# Patient Record
Sex: MALE | Race: WHITE | NOT HISPANIC OR LATINO | Employment: OTHER | URBAN - NONMETROPOLITAN AREA
[De-identification: names, ages, dates, MRNs, and addresses within clinical notes are randomized per-mention and may not be internally consistent; named-entity substitution may affect disease eponyms.]

---

## 2024-06-07 ENCOUNTER — APPOINTMENT (EMERGENCY)
Dept: CT IMAGING | Facility: HOSPITAL | Age: 49
DRG: 392 | End: 2024-06-07
Payer: COMMERCIAL

## 2024-06-07 ENCOUNTER — HOSPITAL ENCOUNTER (INPATIENT)
Facility: HOSPITAL | Age: 49
LOS: 3 days | Discharge: HOME | DRG: 392 | End: 2024-06-10
Attending: EMERGENCY MEDICINE | Admitting: STUDENT IN AN ORGANIZED HEALTH CARE EDUCATION/TRAINING PROGRAM
Payer: COMMERCIAL

## 2024-06-07 DIAGNOSIS — K57.20 DIVERTICULITIS OF LARGE INTESTINE WITH PERFORATION WITHOUT BLEEDING: Primary | ICD-10-CM

## 2024-06-07 LAB
ALBUMIN SERPL BCP-MCNC: 3.8 G/DL (ref 3.5–5)
ALP SERPL-CCNC: 53 U/L (ref 34–104)
ALT SERPL W P-5'-P-CCNC: 16 U/L (ref 7–52)
ANION GAP SERPL CALCULATED.3IONS-SCNC: 9 MMOL/L (ref 4–13)
AST SERPL W P-5'-P-CCNC: 13 U/L (ref 13–39)
BACTERIA UR QL AUTO: NORMAL /HPF
BASOPHILS # BLD AUTO: 0.04 THOUSANDS/ÂΜL (ref 0–0.1)
BASOPHILS NFR BLD AUTO: 0 % (ref 0–1)
BILIRUB DIRECT SERPL-MCNC: 0.28 MG/DL (ref 0–0.2)
BILIRUB SERPL-MCNC: 2.49 MG/DL (ref 0.2–1)
BILIRUB UR QL STRIP: NEGATIVE
BUN SERPL-MCNC: 14 MG/DL (ref 5–25)
CALCIUM SERPL-MCNC: 8.8 MG/DL (ref 8.4–10.2)
CHLORIDE SERPL-SCNC: 102 MMOL/L (ref 96–108)
CLARITY UR: CLEAR
CO2 SERPL-SCNC: 23 MMOL/L (ref 21–32)
COLOR UR: YELLOW
CREAT SERPL-MCNC: 1.09 MG/DL (ref 0.6–1.3)
EOSINOPHIL # BLD AUTO: 0.07 THOUSAND/ÂΜL (ref 0–0.61)
EOSINOPHIL NFR BLD AUTO: 0 % (ref 0–6)
ERYTHROCYTE [DISTWIDTH] IN BLOOD BY AUTOMATED COUNT: 12.9 % (ref 11.6–15.1)
GFR SERPL CREATININE-BSD FRML MDRD: 79 ML/MIN/1.73SQ M
GLUCOSE SERPL-MCNC: 116 MG/DL (ref 65–140)
GLUCOSE UR STRIP-MCNC: NEGATIVE MG/DL
HCT VFR BLD AUTO: 42.8 % (ref 36.5–49.3)
HGB BLD-MCNC: 14.6 G/DL (ref 12–17)
HGB UR QL STRIP.AUTO: ABNORMAL
IMM GRANULOCYTES # BLD AUTO: 0.06 THOUSAND/UL (ref 0–0.2)
IMM GRANULOCYTES NFR BLD AUTO: 0 % (ref 0–2)
KETONES UR STRIP-MCNC: NEGATIVE MG/DL
LACTATE SERPL-SCNC: 1.3 MMOL/L (ref 0.5–2)
LEUKOCYTE ESTERASE UR QL STRIP: NEGATIVE
LIPASE SERPL-CCNC: 10 U/L (ref 11–82)
LYMPHOCYTES # BLD AUTO: 2.3 THOUSANDS/ÂΜL (ref 0.6–4.47)
LYMPHOCYTES NFR BLD AUTO: 14 % (ref 14–44)
MCH RBC QN AUTO: 29.5 PG (ref 26.8–34.3)
MCHC RBC AUTO-ENTMCNC: 34.1 G/DL (ref 31.4–37.4)
MCV RBC AUTO: 87 FL (ref 82–98)
MONOCYTES # BLD AUTO: 1.68 THOUSAND/ÂΜL (ref 0.17–1.22)
MONOCYTES NFR BLD AUTO: 11 % (ref 4–12)
NEUTROPHILS # BLD AUTO: 11.77 THOUSANDS/ÂΜL (ref 1.85–7.62)
NEUTS SEG NFR BLD AUTO: 75 % (ref 43–75)
NITRITE UR QL STRIP: NEGATIVE
NON-SQ EPI CELLS URNS QL MICRO: NORMAL /HPF
NRBC BLD AUTO-RTO: 0 /100 WBCS
PH UR STRIP.AUTO: 7.5 [PH]
PLATELET # BLD AUTO: 261 THOUSANDS/UL (ref 149–390)
PMV BLD AUTO: 9.7 FL (ref 8.9–12.7)
POTASSIUM SERPL-SCNC: 4 MMOL/L (ref 3.5–5.3)
PROT SERPL-MCNC: 7 G/DL (ref 6.4–8.4)
PROT UR STRIP-MCNC: NEGATIVE MG/DL
RBC # BLD AUTO: 4.95 MILLION/UL (ref 3.88–5.62)
RBC #/AREA URNS AUTO: NORMAL /HPF
SODIUM SERPL-SCNC: 134 MMOL/L (ref 135–147)
SP GR UR STRIP.AUTO: 1.01 (ref 1–1.03)
UROBILINOGEN UR QL STRIP.AUTO: 0.2 E.U./DL
WBC # BLD AUTO: 15.92 THOUSAND/UL (ref 4.31–10.16)
WBC #/AREA URNS AUTO: NORMAL /HPF

## 2024-06-07 PROCEDURE — 99285 EMERGENCY DEPT VISIT HI MDM: CPT | Performed by: EMERGENCY MEDICINE

## 2024-06-07 PROCEDURE — 36415 COLL VENOUS BLD VENIPUNCTURE: CPT | Performed by: EMERGENCY MEDICINE

## 2024-06-07 PROCEDURE — 81001 URINALYSIS AUTO W/SCOPE: CPT | Performed by: STUDENT IN AN ORGANIZED HEALTH CARE EDUCATION/TRAINING PROGRAM

## 2024-06-07 PROCEDURE — 96375 TX/PRO/DX INJ NEW DRUG ADDON: CPT

## 2024-06-07 PROCEDURE — 85025 COMPLETE CBC W/AUTO DIFF WBC: CPT | Performed by: EMERGENCY MEDICINE

## 2024-06-07 PROCEDURE — 96365 THER/PROPH/DIAG IV INF INIT: CPT

## 2024-06-07 PROCEDURE — 99284 EMERGENCY DEPT VISIT MOD MDM: CPT

## 2024-06-07 PROCEDURE — 83605 ASSAY OF LACTIC ACID: CPT | Performed by: EMERGENCY MEDICINE

## 2024-06-07 PROCEDURE — 82248 BILIRUBIN DIRECT: CPT | Performed by: EMERGENCY MEDICINE

## 2024-06-07 PROCEDURE — 83690 ASSAY OF LIPASE: CPT | Performed by: EMERGENCY MEDICINE

## 2024-06-07 PROCEDURE — 80053 COMPREHEN METABOLIC PANEL: CPT | Performed by: EMERGENCY MEDICINE

## 2024-06-07 PROCEDURE — 96361 HYDRATE IV INFUSION ADD-ON: CPT

## 2024-06-07 PROCEDURE — 74177 CT ABD & PELVIS W/CONTRAST: CPT

## 2024-06-07 RX ORDER — HEPARIN SODIUM 5000 [USP'U]/ML
5000 INJECTION, SOLUTION INTRAVENOUS; SUBCUTANEOUS EVERY 8 HOURS SCHEDULED
Status: DISCONTINUED | OUTPATIENT
Start: 2024-06-07 | End: 2024-06-10 | Stop reason: HOSPADM

## 2024-06-07 RX ORDER — CIPROFLOXACIN 2 MG/ML
400 INJECTION, SOLUTION INTRAVENOUS ONCE
Status: COMPLETED | OUTPATIENT
Start: 2024-06-07 | End: 2024-06-07

## 2024-06-07 RX ORDER — KETOROLAC TROMETHAMINE 30 MG/ML
15 INJECTION, SOLUTION INTRAMUSCULAR; INTRAVENOUS EVERY 6 HOURS PRN
Status: DISCONTINUED | OUTPATIENT
Start: 2024-06-07 | End: 2024-06-09

## 2024-06-07 RX ORDER — ATORVASTATIN CALCIUM 40 MG/1
40 TABLET, FILM COATED ORAL DAILY
Status: ON HOLD | COMMUNITY

## 2024-06-07 RX ORDER — METRONIDAZOLE 500 MG/100ML
500 INJECTION, SOLUTION INTRAVENOUS
Status: DISCONTINUED | OUTPATIENT
Start: 2024-06-08 | End: 2024-06-08

## 2024-06-07 RX ORDER — ACETAMINOPHEN 325 MG/1
650 TABLET ORAL EVERY 6 HOURS PRN
Status: DISCONTINUED | OUTPATIENT
Start: 2024-06-07 | End: 2024-06-10 | Stop reason: HOSPADM

## 2024-06-07 RX ORDER — METRONIDAZOLE 500 MG/1
500 TABLET ORAL ONCE
Status: COMPLETED | OUTPATIENT
Start: 2024-06-07 | End: 2024-06-07

## 2024-06-07 RX ORDER — SODIUM CHLORIDE 9 MG/ML
75 INJECTION, SOLUTION INTRAVENOUS CONTINUOUS
Status: DISCONTINUED | OUTPATIENT
Start: 2024-06-07 | End: 2024-06-09

## 2024-06-07 RX ORDER — CIPROFLOXACIN 2 MG/ML
400 INJECTION, SOLUTION INTRAVENOUS EVERY 12 HOURS
Status: DISCONTINUED | OUTPATIENT
Start: 2024-06-08 | End: 2024-06-09

## 2024-06-07 RX ORDER — ONDANSETRON 2 MG/ML
4 INJECTION INTRAMUSCULAR; INTRAVENOUS ONCE
Status: COMPLETED | OUTPATIENT
Start: 2024-06-07 | End: 2024-06-07

## 2024-06-07 RX ORDER — MORPHINE SULFATE 4 MG/ML
4 INJECTION, SOLUTION INTRAMUSCULAR; INTRAVENOUS
Status: DISCONTINUED | OUTPATIENT
Start: 2024-06-07 | End: 2024-06-09

## 2024-06-07 RX ADMIN — CIPROFLOXACIN 400 MG: 400 INJECTION, SOLUTION INTRAVENOUS at 16:43

## 2024-06-07 RX ADMIN — SODIUM CHLORIDE 1000 ML: 0.9 INJECTION, SOLUTION INTRAVENOUS at 15:33

## 2024-06-07 RX ADMIN — IOHEXOL 100 ML: 350 INJECTION, SOLUTION INTRAVENOUS at 16:12

## 2024-06-07 RX ADMIN — ONDANSETRON 4 MG: 2 INJECTION INTRAMUSCULAR; INTRAVENOUS at 15:35

## 2024-06-07 RX ADMIN — HEPARIN SODIUM 5000 UNITS: 5000 INJECTION INTRAVENOUS; SUBCUTANEOUS at 21:14

## 2024-06-07 RX ADMIN — METRONIDAZOLE 500 MG: 500 TABLET ORAL at 16:41

## 2024-06-07 RX ADMIN — MORPHINE SULFATE 2 MG: 2 INJECTION, SOLUTION INTRAMUSCULAR; INTRAVENOUS at 15:36

## 2024-06-07 RX ADMIN — SODIUM CHLORIDE 125 ML/HR: 0.9 INJECTION, SOLUTION INTRAVENOUS at 18:35

## 2024-06-07 NOTE — PLAN OF CARE
Problem: PAIN - ADULT  Goal: Verbalizes/displays adequate comfort level or baseline comfort level  Description: Interventions:  - Encourage patient to monitor pain and request assistance  - Assess pain using appropriate pain scale  - Administer analgesics based on type and severity of pain and evaluate response  - Implement non-pharmacological measures as appropriate and evaluate response  - Consider cultural and social influences on pain and pain management  - Notify physician/advanced practitioner if interventions unsuccessful or patient reports new pain  Outcome: Progressing     Problem: INFECTION - ADULT  Goal: Absence or prevention of progression during hospitalization  Description: INTERVENTIONS:  - Assess and monitor for signs and symptoms of infection  - Monitor lab/diagnostic results  - Monitor all insertion sites, i.e. indwelling lines, tubes, and drains  - Monitor endotracheal if appropriate and nasal secretions for changes in amount and color  - [place] appropriate cooling/warming therapies per order  - Administer medications as ordered  - Instruct and encourage patient and family to use good hand hygiene technique  - Identify and instruct in appropriate isolation precautions for identified infection/condition  Outcome: Progressing  Goal: Absence of fever/infection during neutropenic period  Description: INTERVENTIONS:  - Monitor WBC    Outcome: Progressing     Problem: SAFETY ADULT  Goal: Patient will remain free of falls  Description: INTERVENTIONS:  - Educate patient/family on patient safety including physical limitations  - Instruct patient to call for assistance with activity   - Consult OT/PT to assist with strengthening/mobility   - Keep Call bell within reach  - Keep bed low and locked with side rails adjusted as appropriate  - Keep care items and personal belongings within reach  - Initiate and maintain comfort rounds  - Make Fall Risk Sign visible to staff  - Offer Toileting every 2    Hours, in advance of need  - Apply yellow socks and bracelet for high fall risk patients  - Consider moving patient to room near nurses station  Outcome: Progressing  Goal: Maintain or return to baseline ADL function  Description: INTERVENTIONS:  -  Assess patient's ability to carry out ADLs; assess patient's baseline for ADL function and identify physical deficits which impact ability to perform ADLs (bathing, care of mouth/teeth, toileting, grooming, dressing, etc.)  - Assess/evaluate cause of self-care deficits   - Assess range of motion  - Assess patient's mobility; develop plan if impaired  - Assess patient's need for assistive devices and provide as appropriate  - Encourage maximum independence but intervene and supervise when necessary  - Involve family in performance of ADLs  - Assess for home care needs following discharge   - Consider OT consult to assist with ADL evaluation and planning for discharge  - Provide patient education as appropriate  Outcome: Progressing  Goal: Maintains/Returns to pre admission functional level  Description: INTERVENTIONS:  - Perform AM-PAC 6 Click Basic Mobility/ Daily Activity assessment daily.  - Set and communicate daily mobility goal to care team and patient/family/caregiver.   - Collaborate with rehabilitation services on mobility goals if consulted   - Record patient progress and toleration of activity level   Outcome: Progressing

## 2024-06-07 NOTE — H&P
H&P Exam - General Surgery   Michael Velazquez 49 y.o. male MRN: [id number]  Unit/Bed#: ED 05 Encounter: [id number]    Assessment & Plan     Assessment:  49-year-old male who presents due to lower abdominal pain for the past 2 days.  Past medical history only of hyperlipidemia.  On evaluation in the emergency room he is found to have acute diverticulitis with microperforation which is contained.    Leukocytosis of 15.92  T. bili slightly elevated 2.49 with direct fraction of 0.28    CT Abd/pelvis:  IMPRESSION:     Moderate sigmoid diverticulitis with evidence of focal microperforation with extraluminal bubbles of gas. However, no defined fluid collection to suggest an abscess.    Plan:  Admit to surgery service  Recommend conservative management with IV antibiotics and bowel rest  Pain control as needed  Hold home medications at this time  Repeat CBC, CMP in the morning  Discussed the treatment plan with the patient and his wife.  They expressed understanding    History of Present Illness   .  HPI:  Michael Velazquez is a 49 y.o. male who presents with lower abdominal pain.  The patient states he has had significant pain in the lower abdomen for the past 2 days.  Along with the pain he did have chills.  He denies any nausea or vomiting.  His appetite is decreased.  He felt as though he was constipated, he took MiraLAX and did have multiple bowel movements.  He has never had a pain like this in the past.  The patient tells me he had a colonoscopy last year at an outside facility.  No significant abnormality was noted..    Review of Systems   Constitutional:  Positive for appetite change and chills.   HENT: Negative.     Respiratory: Negative.     Cardiovascular: Negative.    Gastrointestinal:  Positive for abdominal pain and constipation.   Genitourinary: Negative.    Musculoskeletal: Negative.    Skin: Negative.    Neurological: Negative.        Historical Information   History reviewed. No pertinent past medical  "history.  History reviewed. No pertinent surgical history.  Social History   Social History     Substance and Sexual Activity   Alcohol Use Not Currently     Social History     Substance and Sexual Activity   Drug Use Never     Social History     Tobacco Use   Smoking Status Never   Smokeless Tobacco Never     E-Cigarette/Vaping    E-Cigarette Use Never User      E-Cigarette/Vaping Substances     Family History: non-contributory    Meds/Allergies   all medications and allergies reviewed  No Known Allergies    Objective   First Vitals:   Blood Pressure: 133/85 (06/07/24 1434)  Pulse: (!) 106 (06/07/24 1434)  Temperature: 99.7 °F (37.6 °C) (06/07/24 1435)  Temp Source: Temporal (06/07/24 1435)  Respirations: 20 (06/07/24 1434)  Height: 5' 3\" (160 cm) (06/07/24 1434)  Weight - Scale: 113 kg (250 lb) (06/07/24 1434)  SpO2: 97 % (06/07/24 1434)    Current Vitals:   Blood Pressure: 120/59 (06/07/24 1715)  Pulse: 90 (06/07/24 1715)  Temperature: 99.7 °F (37.6 °C) (06/07/24 1435)  Temp Source: Temporal (06/07/24 1435)  Respirations: 18 (06/07/24 1545)  Height: 5' 3\" (160 cm) (06/07/24 1434)  Weight - Scale: 113 kg (250 lb) (06/07/24 1434)  SpO2: 97 % (06/07/24 1715)    No intake or output data in the 24 hours ending 06/07/24 1749    Invasive Devices       Peripheral Intravenous Line  Duration             Peripheral IV 06/07/24 Dorsal (posterior);Left Hand <1 day                    Physical Exam  Constitutional:       Appearance: Normal appearance.   HENT:      Head: Normocephalic and atraumatic.      Mouth/Throat:      Mouth: Mucous membranes are moist.   Cardiovascular:      Rate and Rhythm: Normal rate and regular rhythm.   Pulmonary:      Effort: Pulmonary effort is normal.   Abdominal:      General: There is no distension.      Palpations: Abdomen is soft.      Tenderness: There is abdominal tenderness (LLQ and suprapubic).      Comments: Obese, no peritoneal signs   Musculoskeletal:         General: Normal range of " motion.   Skin:     General: Skin is warm and dry.      Coloration: Skin is not jaundiced.   Neurological:      General: No focal deficit present.      Mental Status: He is alert and oriented to person, place, and time.         Lab Results: CBC:   Lab Results   Component Value Date    WBC 15.92 (H) 06/07/2024    HGB 14.6 06/07/2024    HCT 42.8 06/07/2024    MCV 87 06/07/2024     06/07/2024    RBC 4.95 06/07/2024    MCH 29.5 06/07/2024    MCHC 34.1 06/07/2024    RDW 12.9 06/07/2024    MPV 9.7 06/07/2024    NRBC 0 06/07/2024       Imaging:  I personally reviewed the CT scan.  There does appear to be A microperforation of the sigmoid colon with significant thickening of the sigmoid colon consistent with diverticulitis with microperforation  EKG, Pathology, and Other Studies: I have personally reviewed pertinent reports.

## 2024-06-07 NOTE — ED NOTES
Secure chat sent to Primary RN. PT to be transported to unit. No s/s of distress, VS stable, A&Ox4, ID band in place. Paper tubed.     Kathy Curiel RN  06/07/24 1811       Kathy Curiel RN  06/07/24 1811

## 2024-06-07 NOTE — ED PROVIDER NOTES
History  Chief Complaint   Patient presents with    Abdominal Pain     Pt arrives from home with c/o lower abd pain starting a few days ago. Pt denies n/v but currently has diarrhea. Pt took miralax this AM.      Patient is a 49-year-old male presenting to the emergency department complaining of pain in his lower abdomen that is been going on for the past 4 days, he does admit to some nausea but no vomiting, no fever or chills, no dysuria or hematuria, no history of similar symptoms previously, no previous abdominal surgeries, no sick contacts, no questionable food intake, tried taking MiraLAX and had a normal bowel movement today, that did not alleviate his symptoms at all        Prior to Admission Medications   Prescriptions Last Dose Informant Patient Reported? Taking?   Cholecalciferol (VITAMIN D3) 1,000 units tablet Past Week  Yes Yes   Sig: Take 1,000 Units by mouth daily   Omega-3 Fatty Acids (FISH OIL PO) Past Week  Yes Yes   Sig: Take 1 capsule by mouth daily   atorvastatin (LIPITOR) 40 mg tablet Past Week  Yes Yes   Sig: Take 40 mg by mouth daily      Facility-Administered Medications: None       History reviewed. No pertinent past medical history.    History reviewed. No pertinent surgical history.    History reviewed. No pertinent family history.  I have reviewed and agree with the history as documented.    E-Cigarette/Vaping    E-Cigarette Use Never User      E-Cigarette/Vaping Substances     Social History     Tobacco Use    Smoking status: Never    Smokeless tobacco: Never   Vaping Use    Vaping status: Never Used   Substance Use Topics    Alcohol use: Not Currently    Drug use: Never       Review of Systems   Constitutional: Negative.    HENT: Negative.     Eyes: Negative.    Respiratory: Negative.     Cardiovascular: Negative.    Gastrointestinal:  Positive for abdominal pain and nausea.   Endocrine: Negative.    Genitourinary: Negative.    Musculoskeletal: Negative.    Skin: Negative.     Allergic/Immunologic: Negative.    Neurological: Negative.    Hematological: Negative.    Psychiatric/Behavioral: Negative.         Physical Exam  Physical Exam  Constitutional:       Appearance: Normal appearance. He is well-developed.   HENT:      Head: Normocephalic and atraumatic.   Eyes:      Extraocular Movements: EOM normal.      Pupils: Pupils are equal, round, and reactive to light.   Cardiovascular:      Rate and Rhythm: Normal rate and regular rhythm.   Pulmonary:      Effort: Pulmonary effort is normal.      Breath sounds: Normal breath sounds.   Abdominal:      Tenderness: There is abdominal tenderness in the right lower quadrant, suprapubic area and left lower quadrant.   Musculoskeletal:         General: Normal range of motion.      Cervical back: Normal range of motion and neck supple.   Skin:     General: Skin is warm and dry.   Neurological:      Mental Status: He is alert and oriented to person, place, and time.   Psychiatric:         Mood and Affect: Mood and affect normal.         Vital Signs  ED Triage Vitals   Temperature Pulse Respirations Blood Pressure SpO2   06/07/24 1435 06/07/24 1434 06/07/24 1434 06/07/24 1434 06/07/24 1434   99.7 °F (37.6 °C) (!) 106 20 133/85 97 %      Temp Source Heart Rate Source Patient Position - Orthostatic VS BP Location FiO2 (%)   06/07/24 1435 06/07/24 1434 06/07/24 1434 06/07/24 1434 --   Temporal Monitor Sitting Right arm       Pain Score       06/07/24 1434       10 - Worst Possible Pain           Vitals:    06/07/24 1745 06/07/24 1800 06/07/24 1815 06/07/24 1845   BP: 100/50 92/52 93/51 129/76   Pulse: 92 94 92 84   Patient Position - Orthostatic VS:    Lying         Visual Acuity      ED Medications  Medications   sodium chloride 0.9 % infusion (125 mL/hr Intravenous New Bag 6/7/24 1835)   heparin (porcine) subcutaneous injection 5,000 Units (5,000 Units Subcutaneous Given 6/7/24 2114)   ciprofloxacin (CIPRO) IVPB (premix in 5% dextrose) 400 mg 200 mL  (has no administration in time range)   metroNIDAZOLE (FLAGYL) IVPB (premix) 500 mg 100 mL (has no administration in time range)   morphine injection 4 mg (has no administration in time range)   ketorolac (TORADOL) injection 15 mg (has no administration in time range)   acetaminophen (TYLENOL) tablet 650 mg (has no administration in time range)   sodium chloride 0.9 % bolus 1,000 mL (0 mL Intravenous Stopped 6/7/24 1800)   ondansetron (ZOFRAN) injection 4 mg (4 mg Intravenous Given 6/7/24 1535)   morphine injection 2 mg (2 mg Intravenous Given 6/7/24 1536)   iohexol (OMNIPAQUE) 350 MG/ML injection (MULTI-DOSE) 100 mL (100 mL Intravenous Given 6/7/24 1612)   ciprofloxacin (CIPRO) IVPB (premix in 5% dextrose) 400 mg 200 mL (0 mg Intravenous Stopped 6/7/24 1800)   metroNIDAZOLE (FLAGYL) tablet 500 mg (500 mg Oral Given 6/7/24 1641)       Diagnostic Studies  Results Reviewed       Procedure Component Value Units Date/Time    UA w Reflex to Microscopic w Reflex to Culture [[id number]]  (Abnormal) Collected: 06/07/24 1834    Lab Status: Final result Specimen: Urine, Clean Catch Updated: 06/07/24 1850     Color, UA Yellow     Clarity, UA Clear     Specific Gravity, UA 1.010     pH, UA 7.5     Leukocytes, UA Negative     Nitrite, UA Negative     Protein, UA Negative mg/dl      Glucose, UA Negative mg/dl      Ketones, UA Negative mg/dl      Urobilinogen, UA 0.2 E.U./dl      Bilirubin, UA Negative     Occult Blood, UA Moderate    Bilirubin, direct [[id number]]  (Abnormal) Collected: 06/07/24 1532    Lab Status: Final result Specimen: Blood from Arm, Left Updated: 06/07/24 1623     Bilirubin, Direct 0.28 mg/dL     Lipase [[id number]]  (Abnormal) Collected: 06/07/24 1532    Lab Status: Final result Specimen: Blood from Arm, Left Updated: 06/07/24 1601     Lipase 10 u/L     Comprehensive metabolic panel [[id number]]  (Abnormal) Collected: 06/07/24 1532    Lab Status: Final result Specimen: Blood from Arm, Left Updated: 06/07/24 1601      Sodium 134 mmol/L      Potassium 4.0 mmol/L      Chloride 102 mmol/L      CO2 23 mmol/L      ANION GAP 9 mmol/L      BUN 14 mg/dL      Creatinine 1.09 mg/dL      Glucose 116 mg/dL      Calcium 8.8 mg/dL      AST 13 U/L      ALT 16 U/L      Alkaline Phosphatase 53 U/L      Total Protein 7.0 g/dL      Albumin 3.8 g/dL      Total Bilirubin 2.49 mg/dL      eGFR 79 ml/min/1.73sq m     Narrative:      [employer] Kid[employer] guidelines for Chronic Kidney Disease (CKD):     Stage 1 with normal or high GFR (GFR > 90 mL/min/1.73 square meters)    Stage 2 Mild CKD (GFR = 60-89 mL/min/1.73 square meters)    Stage 3A Moderate CKD (GFR = 45-59 mL/min/1.73 square meters)    Stage 3B Moderate CKD (GFR = 30-44 mL/min/1.73 square meters)    Stage 4 Severe CKD (GFR = 15-29 mL/min/1.73 square meters)    Stage 5 End Stage CKD (GFR <15 mL/min/1.73 square meters)  Note: GFR calculation is accurate only with a steady state creatinine    Lactic acid, plasma (w/reflex if result > 2.0) [[id number]]  (Normal) Collected: 06/07/24 1532    Lab Status: Final result Specimen: Blood from Arm, Left Updated: 06/07/24 1600     LACTIC ACID 1.3 mmol/L     Narrative:      Result may be elevated if tourniquet was used during collection.    CBC and differential [[id number]]  (Abnormal) Collected: 06/07/24 1532    Lab Status: Final result Specimen: Blood from Arm, Left Updated: 06/07/24 1541     WBC 15.92 Thousand/uL      RBC 4.95 Million/uL      Hemoglobin 14.6 g/dL      Hematocrit 42.8 %      MCV 87 fL      MCH 29.5 pg      MCHC 34.1 g/dL      RDW 12.9 %      MPV 9.7 fL      Platelets 261 Thousands/uL      nRBC 0 /100 WBCs      Segmented % 75 %      Immature Grans % 0 %      Lymphocytes % 14 %      Monocytes % 11 %      Eosinophils Relative 0 %      Basophils Relative 0 %      Absolute Neutrophils 11.77 Thousands/µL      Absolute Immature Grans 0.06 Thousand/uL      Absolute Lymphocytes 2.30 Thousands/µL      Absolute Monocytes 1.68  Thousand/µL      Eosinophils Absolute 0.07 Thousand/µL      Basophils Absolute 0.04 Thousands/µL                    CT abdomen pelvis with contrast   Final Result by Joseluis Johnston MD (06/07 1628)      Moderate sigmoid diverticulitis with evidence of focal microperforation with extraluminal bubbles of gas. However, no defined fluid collection to suggest an abscess.      The study was marked in EPIC for immediate notification.         Workstation performed: [id number]                    Procedures  Procedures         ED Course  ED Course as of 06/07/24 2131 Fri Jun 07, 2024 2130 UA w Reflex to Microscopic w Reflex to Culture(!)   2130 Bilirubin, direct(!)   2130 Lipase(!)   2130 Comprehensive metabolic panel(!)   2130 Lactic acid, plasma (w/reflex if result > 2.0)   2130 CBC and differential(!)   2130 CT abdomen pelvis with contrast                                             Medical Decision Making  Abdominal exam without peritoneal signs.  No evidence of acute abdomen at this time.  Well-appearing.  Given work-up, low suspicion for acute hepatobiliary disease (including acute cholecystitis or cholangitis), acute pancreatitis (negative lipase), PUD (including gastric perforation), acute infectious processes (pneumonia, hepatitis, pyelonephritis), acute appendicitis, vascular catastrophe, bowel obstruction, ovarian/testicular torsion.  Presentation not consistent with other acute emergent causes of abdominal pain at this time.  Findings concerning for diverticulitis with microperforation, surgery was consulted, evaluated patient and will admit to surgical service for further evaluation and treatment    Problems Addressed:  Diverticulitis of large intestine with perforation without bleeding: acute illness or injury    Amount and/or Complexity of Data Reviewed  Labs: ordered. Decision-making details documented in ED Course.  Radiology: ordered. Decision-making details documented in ED Course.    Risk  OTC  drugs.  Prescription drug management.  Decision regarding hospitalization.             Disposition  Final diagnoses:   Diverticulitis of large intestine with perforation without bleeding     Time reflects when diagnosis was documented in both MDM as applicable and the Disposition within this note       Time User Action Codes Description Comment    6/7/2024  5:43 PM Sujata Montejo Add [K57.20] Diverticulitis of large intestine with perforation without bleeding           ED Disposition       ED Disposition   Admit    Condition   Stable    Date/Time   Fri Jun 7, 2024  5:43 PM    Comment   Case was discussed with Dr Woo and the patient's admission status was agreed to be Admission Status: inpatient status to the service of Dr. Woo .               Follow-up Information    None         Current Discharge Medication List        CONTINUE these medications which have NOT CHANGED    Details   atorvastatin (LIPITOR) 40 mg tablet Take 40 mg by mouth daily      Cholecalciferol (VITAMIN D3) 1,000 units tablet Take 1,000 Units by mouth daily      Omega-3 Fatty Acids (FISH OIL PO) Take 1 capsule by mouth daily             No discharge procedures on file.    PDMP Review       None            ED Provider  Electronically Signed by             Sujata Montejo DO  06/07/24 8813

## 2024-06-08 LAB
ANION GAP SERPL CALCULATED.3IONS-SCNC: 5 MMOL/L (ref 4–13)
BASOPHILS # BLD AUTO: 0.06 THOUSANDS/ÂΜL (ref 0–0.1)
BASOPHILS NFR BLD AUTO: 0 % (ref 0–1)
BUN SERPL-MCNC: 14 MG/DL (ref 5–25)
CALCIUM SERPL-MCNC: 8.3 MG/DL (ref 8.4–10.2)
CHLORIDE SERPL-SCNC: 105 MMOL/L (ref 96–108)
CO2 SERPL-SCNC: 26 MMOL/L (ref 21–32)
CREAT SERPL-MCNC: 0.96 MG/DL (ref 0.6–1.3)
EOSINOPHIL # BLD AUTO: 0.09 THOUSAND/ÂΜL (ref 0–0.61)
EOSINOPHIL NFR BLD AUTO: 1 % (ref 0–6)
ERYTHROCYTE [DISTWIDTH] IN BLOOD BY AUTOMATED COUNT: 13 % (ref 11.6–15.1)
GFR SERPL CREATININE-BSD FRML MDRD: 92 ML/MIN/1.73SQ M
GLUCOSE SERPL-MCNC: 110 MG/DL (ref 65–140)
HCT VFR BLD AUTO: 39.6 % (ref 36.5–49.3)
HGB BLD-MCNC: 13.1 G/DL (ref 12–17)
IMM GRANULOCYTES # BLD AUTO: 0.07 THOUSAND/UL (ref 0–0.2)
IMM GRANULOCYTES NFR BLD AUTO: 0 % (ref 0–2)
LYMPHOCYTES # BLD AUTO: 2.52 THOUSANDS/ÂΜL (ref 0.6–4.47)
LYMPHOCYTES NFR BLD AUTO: 16 % (ref 14–44)
MCH RBC QN AUTO: 29.2 PG (ref 26.8–34.3)
MCHC RBC AUTO-ENTMCNC: 33.1 G/DL (ref 31.4–37.4)
MCV RBC AUTO: 88 FL (ref 82–98)
MONOCYTES # BLD AUTO: 1.59 THOUSAND/ÂΜL (ref 0.17–1.22)
MONOCYTES NFR BLD AUTO: 10 % (ref 4–12)
NEUTROPHILS # BLD AUTO: 11.25 THOUSANDS/ÂΜL (ref 1.85–7.62)
NEUTS SEG NFR BLD AUTO: 73 % (ref 43–75)
NRBC BLD AUTO-RTO: 0 /100 WBCS
PLATELET # BLD AUTO: 205 THOUSANDS/UL (ref 149–390)
PMV BLD AUTO: 9.6 FL (ref 8.9–12.7)
POTASSIUM SERPL-SCNC: 3.9 MMOL/L (ref 3.5–5.3)
RBC # BLD AUTO: 4.49 MILLION/UL (ref 3.88–5.62)
SODIUM SERPL-SCNC: 136 MMOL/L (ref 135–147)
WBC # BLD AUTO: 15.58 THOUSAND/UL (ref 4.31–10.16)

## 2024-06-08 PROCEDURE — 85025 COMPLETE CBC W/AUTO DIFF WBC: CPT | Performed by: STUDENT IN AN ORGANIZED HEALTH CARE EDUCATION/TRAINING PROGRAM

## 2024-06-08 PROCEDURE — 80048 BASIC METABOLIC PNL TOTAL CA: CPT | Performed by: STUDENT IN AN ORGANIZED HEALTH CARE EDUCATION/TRAINING PROGRAM

## 2024-06-08 RX ORDER — METRONIDAZOLE 500 MG/100ML
500 INJECTION, SOLUTION INTRAVENOUS EVERY 8 HOURS
Status: DISCONTINUED | OUTPATIENT
Start: 2024-06-08 | End: 2024-06-09

## 2024-06-08 RX ADMIN — SODIUM CHLORIDE 125 ML/HR: 0.9 INJECTION, SOLUTION INTRAVENOUS at 02:44

## 2024-06-08 RX ADMIN — CIPROFLOXACIN 400 MG: 400 INJECTION, SOLUTION INTRAVENOUS at 04:30

## 2024-06-08 RX ADMIN — CIPROFLOXACIN 400 MG: 400 INJECTION, SOLUTION INTRAVENOUS at 16:51

## 2024-06-08 RX ADMIN — HEPARIN SODIUM 5000 UNITS: 5000 INJECTION INTRAVENOUS; SUBCUTANEOUS at 15:00

## 2024-06-08 RX ADMIN — HEPARIN SODIUM 5000 UNITS: 5000 INJECTION INTRAVENOUS; SUBCUTANEOUS at 05:13

## 2024-06-08 RX ADMIN — HEPARIN SODIUM 5000 UNITS: 5000 INJECTION INTRAVENOUS; SUBCUTANEOUS at 22:02

## 2024-06-08 RX ADMIN — METRONIDAZOLE 500 MG: 500 INJECTION, SOLUTION INTRAVENOUS at 09:17

## 2024-06-08 RX ADMIN — METRONIDAZOLE 500 MG: 500 INJECTION, SOLUTION INTRAVENOUS at 17:30

## 2024-06-08 RX ADMIN — SODIUM CHLORIDE 75 ML/HR: 0.9 INJECTION, SOLUTION INTRAVENOUS at 16:23

## 2024-06-08 NOTE — PLAN OF CARE
Problem: PAIN - ADULT  Goal: Verbalizes/displays adequate comfort level or baseline comfort level  Description: Interventions:  - Encourage patient to monitor pain and request assistance  - Assess pain using appropriate pain scale  - Administer analgesics based on type and severity of pain and evaluate response  - Implement non-pharmacological measures as appropriate and evaluate response  - Consider cultural and social influences on pain and pain management  - Notify physician/advanced practitioner if interventions unsuccessful or patient reports new pain  Outcome: Progressing     Problem: INFECTION - ADULT  Goal: Absence or prevention of progression during hospitalization  Description: INTERVENTIONS:  - Assess and monitor for signs and symptoms of infection  - Monitor lab/diagnostic results  - Monitor all insertion sites, i.e. indwelling lines, tubes, and drains  - Monitor endotracheal if appropriate and nasal secretions for changes in amount and color  - [place] appropriate cooling/warming therapies per order  - Administer medications as ordered  - Instruct and encourage patient and family to use good hand hygiene technique  - Identify and instruct in appropriate isolation precautions for identified infection/condition  Outcome: Progressing

## 2024-06-08 NOTE — UTILIZATION REVIEW
Initial Clinical Review    Admission: Date/Time/Statement:   Admission Orders (From admission, onward)       Ordered        06/07/24 1744  INPATIENT ADMISSION  Once                          Orders Placed This Encounter   Procedures    Inpatient Admission     Standing Status:   Standing     Number of Occurrences:   1     Order Specific Question:   Level of Care     Answer:   Med Surg [16]     Order Specific Question:   Estimated length of stay     Answer:   More than 2 Midnights     Order Specific Question:   Certification     Answer:   I certify that inpatient services are medically necessary for this patient for a duration of greater than two midnights. See H&P and MD Progress Notes for additional information about the patient's course of treatment.     ED Arrival Information       Expected   -    Arrival   6/7/2024 14:24    Acuity   Urgent              Means of arrival   Walk-In    Escorted by   Family Member    Service   Surgery-General    Admission type   Emergency              Arrival complaint   lower abd pain             Chief Complaint   Patient presents with    Abdominal Pain     Pt arrives from home with c/o lower abd pain starting a few days ago. Pt denies n/v but currently has diarrhea. Pt took miralax this AM.        Initial Presentation: 49 y.o. male with PMH of hyperlipidemia presented to the ED from home for evaluation of lower abdominal pain for the past two days.  ED imaging revealed acute diverticulitis with microperforation which is contained. WBC elevated at 15.92.  Exam:  Abdominal tenderness, LLQ and suprapubic.    6/7 Inpatient admission for evaluation and treatment of acute diverticulitis with contained microperforation:  IV antibiotics, bowel rest, pain control PRN. CBC and CMP in AM.    6/8 General Surgery:  Patient reports improvement in LLQ pain, bowel movement this AM. WBC 15.5.  No history of prior episodes of diverticulitis. Advance to clear liquid diet. Continue IVF @ 75 ml/hour.  Continue IV Cipro and Flagyl. Repeat CBC, CMP in AM. Pain control, PRN. Exam:  Mild tenderness with deep palpation in the LLQ, otherwise abd NT.      ED Triage Vitals   Temperature Pulse Respirations Blood Pressure SpO2   06/07/24 1435 06/07/24 1434 06/07/24 1434 06/07/24 1434 06/07/24 1434   99.7 °F (37.6 °C) (!) 106 20 133/85 97 %      Temp Source Heart Rate Source Patient Position - Orthostatic VS BP Location FiO2 (%)   06/07/24 1435 06/07/24 1434 06/07/24 1434 06/07/24 1434 --   Temporal Monitor Sitting Right arm       Pain Score       06/07/24 1434       10 - Worst Possible Pain          Wt Readings from Last 1 Encounters:   06/07/24 113 kg (250 lb)     Additional Vital Signs:      Date/Time Temp Pulse Resp BP MAP (mmHg) SpO2 O2 Device   06/08/24 0700 98.2 °F (36.8 °C) 71 18 131/78 96 93 % --   06/07/24 2349 98.8 °F (37.1 °C) 78 20 112/61 78 96 % --   06/07/24 2100 -- -- -- -- -- -- None (Room air)   06/07/24 1845 99.9 °F (37.7 °C) 84 16 129/76 94 94 % None (Room air)   06/07/24 1815 -- 92 -- 93/51 68 95 % --   06/07/24 1800 -- 94 -- 92/52 71 96 % --   06/07/24 1745 -- 92 -- 100/50 70 95 % --   06/07/24 1715 -- 90 -- 120/59 84 97 % --   06/07/24 1700 -- 89 -- 119/57 82 96 % --   06/07/24 1645 -- 96 -- 133/60 86 98 % --   06/07/24 1630 -- 94 -- 119/56 81 94 % --   06/07/24 1615 -- 99 -- -- -- 97 % --   06/07/24 1545 -- 88 18 -- -- 97 % --         Pertinent Labs/Diagnostic Test Results:       CT abdomen pelvis with contrast   Final Result by Joseluis Johnston MD (06/07 1628)      Moderate sigmoid diverticulitis with evidence of focal microperforation with extraluminal bubbles of gas. However, no defined fluid collection to suggest an abscess.      The study was marked in EPIC for immediate notification.         Workstation performed: [id number]               Results from last 7 days   Lab Units 06/08/24  0608 06/07/24  1532   WBC Thousand/uL 15.58* 15.92*   HEMOGLOBIN g/dL 13.1 14.6   HEMATOCRIT % 39.6 42.8    PLATELETS Thousands/uL 205 261   TOTAL NEUT ABS Thousands/µL 11.25* 11.77*         Results from last 7 days   Lab Units 06/08/24  0608 06/07/24  1532   SODIUM mmol/L 136 134*   POTASSIUM mmol/L 3.9 4.0   CHLORIDE mmol/L 105 102   CO2 mmol/L 26 23   ANION GAP mmol/L 5 9   BUN mg/dL 14 14   CREATININE mg/dL 0.96 1.09   EGFR ml/min/1.73sq m 92 79   CALCIUM mg/dL 8.3* 8.8     Results from last 7 days   Lab Units 06/07/24  1532   AST U/L 13   ALT U/L 16   ALK PHOS U/L 53   TOTAL PROTEIN g/dL 7.0   ALBUMIN g/dL 3.8   TOTAL BILIRUBIN mg/dL 2.49*   BILIRUBIN DIRECT mg/dL 0.28*         Results from last 7 days   Lab Units 06/08/24  0608 06/07/24  1532   GLUCOSE RANDOM mg/dL 110 116         Results from last 7 days   Lab Units 06/07/24  1532   LACTIC ACID mmol/L 1.3           Results from last 7 days   Lab Units 06/07/24  1532   LIPASE u/L 10*     Results from last 7 days   Lab Units 06/07/24  1834   CLARITY UA  Clear   COLOR UA  Yellow   SPEC GRAV UA  1.010   PH UA  7.5   GLUCOSE UA mg/dl Negative   KETONES UA mg/dl Negative   BLOOD UA  Moderate*   PROTEIN UA mg/dl Negative   NITRITE UA  Negative   BILIRUBIN UA  Negative   UROBILINOGEN UA E.U./dl 0.2   LEUKOCYTES UA  Negative   WBC UA /hpf 0-1   RBC UA /hpf 0-1   BACTERIA UA /hpf Occasional   EPITHELIAL CELLS WET PREP /hpf None Seen         ED Treatment:   Medication Administration from 06/07/2024 1421 to 06/07/2024 1834         Date/Time Order Dose Route Action     06/07/2024 1800 EDT sodium chloride 0.9 % bolus 1,000 mL 0 mL Intravenous Stopped     06/07/2024 1533 EDT sodium chloride 0.9 % bolus 1,000 mL 1,000 mL Intravenous New Bag     06/07/2024 1535 EDT ondansetron (ZOFRAN) injection 4 mg 4 mg Intravenous Given     06/07/2024 1536 EDT morphine injection 2 mg 2 mg Intravenous Given     06/07/2024 1612 EDT iohexol (OMNIPAQUE) 350 MG/ML injection (MULTI-DOSE) 100 mL 100 mL Intravenous Given     06/07/2024 1800 EDT ciprofloxacin (CIPRO) IVPB (premix in 5% dextrose) 400 mg  200 mL 0 mg Intravenous Stopped     06/07/2024 1643 EDT ciprofloxacin (CIPRO) IVPB (premix in 5% dextrose) 400 mg 200 mL 400 mg Intravenous New Bag     06/07/2024 1641 EDT metroNIDAZOLE (FLAGYL) tablet 500 mg 500 mg Oral Given              Admitting Diagnosis: Abdominal pain [R10.9]  Diverticulitis of large intestine with perforation without bleeding [K57.20]  Age/Sex: 49 y.o. male      Admission Orders: NPO, sips with meds      Scheduled Medications:    ciprofloxacin, 400 mg, Intravenous, Q12H  heparin (porcine), 5,000 Units, Subcutaneous, Q8H DILCIA  metroNIDAZOLE, 500 mg, Intravenous, Q8H      Continuous IV Infusions:      sodium chloride, 75 mL/hr, Intravenous, Continuous      PRN Meds: None given.  acetaminophen, 650 mg, Oral, Q6H PRN  ketorolac, 15 mg, Intravenous, Q6H PRN  morphine injection, 4 mg, Intravenous, Q3H PRN          Network Utilization Review Department  ATTENTION: Please call with any questions or concerns to [phone] and carefully listen to the prompts so that you are directed to the right person. All voicemails are confidential.   For Discharge needs, contact Care Management DC Support Team at [phone] opt. 2  Send all requests for admission clinical reviews, approved or denied determinations and any other requests to dedicated fax number below belonging to the campus where the patient is receiving treatment. List of dedicated fax numbers for the Facilities:  FACILITY NAME UR FAX NUMBER   ADMISSION DENIALS (Administrative/Medical Necessity) [phone]   DISCHARGE SUPPORT TEAM (NETWORK) [phone]   PARENT CHILD HEALTH (Maternity/NICU/Pediatrics) [phone]   [facility] [phone]   [facility] [phone]   [facility] [phone]   [facility] [phone]   [facility] [phone]   [facility] [phone]   [facility]  [facility] [phone]   [facility] [phone]   [facility] [phone]   [facility] [phone]   [facility] [phone]   [facility] [phone]

## 2024-06-08 NOTE — PROGRESS NOTES
"Progress Note - General Surgery   Michael Velazquez 49 y.o. male MRN: [id number]  Unit/Bed#: -01 Encounter: [id number]    Assessment:   48yo M HD#2 admitted with acute diverticulitis with contained microperforation  -Afebrile, VSS and WNL  -WBC 15.5 (15.9)  -No hx of prior episodes of diverticulitis  -screening colonoscopy last year with no findings per pt  -today reports improvement in LLQ pain, BM this am    Plan:  Advance to CLD  Continue IVF, decrease rate to 75ml/hr  Continue IV Abx -Cipro/Flagyl  Repeat CBC, CMP in am  Serial exams  DVT ppx, heparin  Pain control as needed    Subjective: He feels much better today. His pain is improved, not even requiring pain medicine. Denies nausea, vomiting. Had a nl BM this morning. Urinating without difficulty.    Objective:   General: VSS, NAD, alert, pleasant  Lungs nl respiratory effort  Heart RRR   Abd soft, no distension, mild tenderness with deep palpation in the LLQ, otherwise abd NT, no masses or guarding  Extremities: FAROM with good strength equal bilaterally, no edema  Neuro: A&Ox3, affect appropriate, distal sensation and muscular strength intact      Blood pressure 131/78, pulse 71, temperature 98.2 °F (36.8 °C), temperature source Temporal, resp. rate 18, height 5' 3\" (1.6 m), weight 113 kg (250 lb), SpO2 93%.,Body mass index is 44.29 kg/m².      Intake/Output Summary (Last 24 hours) at 6/8/2024 1206  Last data filed at 6/8/2024 0430  Gross per 24 hour   Intake 1200 ml   Output 5 ml   Net 1195 ml       Invasive Devices       Peripheral Intravenous Line  Duration             Peripheral IV 06/07/24 Dorsal (posterior);Left Hand <1 day                  Lab, Imaging and other studies:CBC:   Lab Results   Component Value Date    WBC 15.58 (H) 06/08/2024    HGB 13.1 06/08/2024    HCT 39.6 06/08/2024    MCV 88 06/08/2024     06/08/2024    RBC 4.49 06/08/2024    MCH 29.2 06/08/2024    MCHC 33.1 06/08/2024    RDW 13.0 06/08/2024    MPV 9.6 06/08/2024    " "NRBC 0 06/08/2024   , CMP:   Lab Results   Component Value Date    SODIUM 136 06/08/2024    K 3.9 06/08/2024     06/08/2024    CO2 26 06/08/2024    BUN 14 06/08/2024    CREATININE 0.96 06/08/2024    CALCIUM 8.3 (L) 06/08/2024    AST 13 06/07/2024    ALT 16 06/07/2024    ALKPHOS 53 06/07/2024    EGFR 92 06/08/2024   , Coagulation: No results found for: \"PT\", \"INR\", \"APTT\", Urinalysis:   Lab Results   Component Value Date    COLORU Yellow 06/07/2024    CLARITYU Clear 06/07/2024    SPECGRAV 1.010 06/07/2024    PHUR 7.5 06/07/2024    LEUKOCYTESUR Negative 06/07/2024    NITRITE Negative 06/07/2024    GLUCOSEU Negative 06/07/2024    KETONESU Negative 06/07/2024    BILIRUBINUR Negative 06/07/2024    BLOODU Moderate (A) 06/07/2024   , Amylase: No results found for: \"AMYLASE\", Lipase:   Lab Results   Component Value Date    LIPASE 10 (L) 06/07/2024     VTE Pharmacologic Prophylaxis: subcu heparin  VTE Mechanical Prophylaxis: sequential compression device      "

## 2024-06-08 NOTE — PLAN OF CARE
Problem: PAIN - ADULT  Goal: Verbalizes/displays adequate comfort level or baseline comfort level  Description: Interventions:  - Encourage patient to monitor pain and request assistance  - Assess pain using appropriate pain scale  - Administer analgesics based on type and severity of pain and evaluate response  - Implement non-pharmacological measures as appropriate and evaluate response  - Consider cultural and social influences on pain and pain management  - Notify physician/advanced practitioner if interventions unsuccessful or patient reports new pain  Outcome: Progressing     Problem: INFECTION - ADULT  Goal: Absence or prevention of progression during hospitalization  Description: INTERVENTIONS:  - Assess and monitor for signs and symptoms of infection  - Monitor lab/diagnostic results  - Monitor all insertion sites, i.e. indwelling lines, tubes, and drains  - Monitor endotracheal if appropriate and nasal secretions for changes in amount and color  - [place] appropriate cooling/warming therapies per order  - Administer medications as ordered  - Instruct and encourage patient and family to use good hand hygiene technique  - Identify and instruct in appropriate isolation precautions for identified infection/condition  Outcome: Progressing  Goal: Absence of fever/infection during neutropenic period  Description: INTERVENTIONS:  - Monitor WBC    Outcome: Progressing     Problem: SAFETY ADULT  Goal: Patient will remain free of falls  Description: INTERVENTIONS:  - Educate patient/family on patient safety including physical limitations  - Instruct patient to call for assistance with activity   - Consult OT/PT to assist with strengthening/mobility   - Keep Call bell within reach  - Keep bed low and locked with side rails adjusted as appropriate  - Keep care items and personal belongings within reach  - Initiate and maintain comfort rounds  - Make Fall Risk Sign visible to staff  - Offer Toileting every    Hours,  in advance of need  - Initiate/Maintain   alarm  - Obtain necessary fall risk management equipment:     - Apply yellow socks and bracelet for high fall risk patients  - Consider moving patient to room near nurses station  Outcome: Progressing  Goal: Maintain or return to baseline ADL function  Description: INTERVENTIONS:  -  Assess patient's ability to carry out ADLs; assess patient's baseline for ADL function and identify physical deficits which impact ability to perform ADLs (bathing, care of mouth/teeth, toileting, grooming, dressing, etc.)  - Assess/evaluate cause of self-care deficits   - Assess range of motion  - Assess patient's mobility; develop plan if impaired  - Assess patient's need for assistive devices and provide as appropriate  - Encourage maximum independence but intervene and supervise when necessary  - Involve family in performance of ADLs  - Assess for home care needs following discharge   - Consider OT consult to assist with ADL evaluation and planning for discharge  - Provide patient education as appropriate  Outcome: Progressing  Goal: Maintains/Returns to pre admission functional level  Description: INTERVENTIONS:  - Perform AM-PAC 6 Click Basic Mobility/ Daily Activity assessment daily.  - Set and communicate daily mobility goal to care team and patient/family/caregiver.   - Collaborate with rehabilitation services on mobility goals if consulted  - Perform Range of Motion    times a day.  - Reposition patient every    hours.  - Dangle patient    times a day  - Stand patient    times a day  - Ambulate patient    times a day  - Out of bed to chair    times a day   - Out of bed for meals          times a day  - Out of bed for toileting  - Record patient progress and toleration of activity level   Outcome: Progressing     Problem: DISCHARGE PLANNING  Goal: Discharge to home or other facility with appropriate resources  Description: INTERVENTIONS:  - Identify barriers to discharge w/patient and  caregiver  - Arrange for needed discharge resources and transportation as appropriate  - Identify discharge learning needs (meds, wound care, etc.)  - Arrange for interpretive services to assist at discharge as needed  - Refer to Case Management Department for coordinating discharge planning if the patient needs post-hospital services based on physician/advanced practitioner order or complex needs related to functional status, cognitive ability, or social support system  Outcome: Progressing

## 2024-06-08 NOTE — UTILIZATION REVIEW
NOTIFICATION OF INPATIENT ADMISSION   AUTHORIZATION REQUEST   SERVICING FACILITY:   [place] [address]  Tax ID: [id number]  NPI: [id number] ATTENDING PROVIDER:  Attending Name and NPI#: Nani Woo Do [[id number]]  Address: [address] [place] [address]  Phone: [phone]   ADMISSION INFORMATION:  Place of Service: Inpatient [facility]  Place of Service Code: 21  Inpatient Admission Date/Time: 6/7/24  5:44 PM  Discharge Date/Time: No discharge date for patient encounter.  Admitting Diagnosis Code/Description:  Abdominal pain [R10.9]  Diverticulitis of large intestine with perforation without bleeding [K57.20]     UTILIZATION REVIEW CONTACT:  Celia Viera Utilization   Network Utilization Review Department  Phone: [phone]  Fax [phone]  Email: [email or web address]  Contact for approvals/pending authorizations, clinical reviews, and discharge.     PHYSICIAN ADVISORY SERVICES:  Medical Necessity Denial & Jiay-sh-Bqbd Review  Phone: [phone]  Fax: [phone]  Email: [email or web address]     DISCHARGE SUPPORT TEAM:  For Patients Discharge Needs & Updates  Phone: [phone] opt. 2 Fax: [phone]  Email: [email or web address]

## 2024-06-09 LAB
ALBUMIN SERPL BCP-MCNC: 3.8 G/DL (ref 3.5–5)
ALP SERPL-CCNC: 51 U/L (ref 34–104)
ALT SERPL W P-5'-P-CCNC: 13 U/L (ref 7–52)
ANION GAP SERPL CALCULATED.3IONS-SCNC: 6 MMOL/L (ref 4–13)
AST SERPL W P-5'-P-CCNC: 14 U/L (ref 13–39)
BASOPHILS # BLD AUTO: 0.04 THOUSANDS/ÂΜL (ref 0–0.1)
BASOPHILS NFR BLD AUTO: 0 % (ref 0–1)
BILIRUB SERPL-MCNC: 1.44 MG/DL (ref 0.2–1)
BUN SERPL-MCNC: 10 MG/DL (ref 5–25)
CALCIUM SERPL-MCNC: 8.9 MG/DL (ref 8.4–10.2)
CHLORIDE SERPL-SCNC: 105 MMOL/L (ref 96–108)
CO2 SERPL-SCNC: 27 MMOL/L (ref 21–32)
CREAT SERPL-MCNC: 0.99 MG/DL (ref 0.6–1.3)
EOSINOPHIL # BLD AUTO: 0.22 THOUSAND/ÂΜL (ref 0–0.61)
EOSINOPHIL NFR BLD AUTO: 2 % (ref 0–6)
ERYTHROCYTE [DISTWIDTH] IN BLOOD BY AUTOMATED COUNT: 12.9 % (ref 11.6–15.1)
GFR SERPL CREATININE-BSD FRML MDRD: 89 ML/MIN/1.73SQ M
GLUCOSE SERPL-MCNC: 94 MG/DL (ref 65–140)
HCT VFR BLD AUTO: 44 % (ref 36.5–49.3)
HGB BLD-MCNC: 14.6 G/DL (ref 12–17)
IMM GRANULOCYTES # BLD AUTO: 0.04 THOUSAND/UL (ref 0–0.2)
IMM GRANULOCYTES NFR BLD AUTO: 0 % (ref 0–2)
LYMPHOCYTES # BLD AUTO: 2.32 THOUSANDS/ÂΜL (ref 0.6–4.47)
LYMPHOCYTES NFR BLD AUTO: 24 % (ref 14–44)
MCH RBC QN AUTO: 29.1 PG (ref 26.8–34.3)
MCHC RBC AUTO-ENTMCNC: 33.2 G/DL (ref 31.4–37.4)
MCV RBC AUTO: 88 FL (ref 82–98)
MONOCYTES # BLD AUTO: 0.86 THOUSAND/ÂΜL (ref 0.17–1.22)
MONOCYTES NFR BLD AUTO: 9 % (ref 4–12)
NEUTROPHILS # BLD AUTO: 6.22 THOUSANDS/ÂΜL (ref 1.85–7.62)
NEUTS SEG NFR BLD AUTO: 65 % (ref 43–75)
NRBC BLD AUTO-RTO: 0 /100 WBCS
PLATELET # BLD AUTO: 215 THOUSANDS/UL (ref 149–390)
PMV BLD AUTO: 9.5 FL (ref 8.9–12.7)
POTASSIUM SERPL-SCNC: 3.9 MMOL/L (ref 3.5–5.3)
PROT SERPL-MCNC: 7.8 G/DL (ref 6.4–8.4)
RBC # BLD AUTO: 5.02 MILLION/UL (ref 3.88–5.62)
SODIUM SERPL-SCNC: 138 MMOL/L (ref 135–147)
WBC # BLD AUTO: 9.7 THOUSAND/UL (ref 4.31–10.16)

## 2024-06-09 PROCEDURE — 80053 COMPREHEN METABOLIC PANEL: CPT | Performed by: PHYSICIAN ASSISTANT

## 2024-06-09 PROCEDURE — 85025 COMPLETE CBC W/AUTO DIFF WBC: CPT | Performed by: PHYSICIAN ASSISTANT

## 2024-06-09 RX ORDER — CIPROFLOXACIN 500 MG/1
500 TABLET, FILM COATED ORAL EVERY 12 HOURS SCHEDULED
Status: DISCONTINUED | OUTPATIENT
Start: 2024-06-09 | End: 2024-06-10 | Stop reason: HOSPADM

## 2024-06-09 RX ORDER — METRONIDAZOLE 500 MG/1
500 TABLET ORAL EVERY 8 HOURS SCHEDULED
Status: DISCONTINUED | OUTPATIENT
Start: 2024-06-09 | End: 2024-06-10 | Stop reason: HOSPADM

## 2024-06-09 RX ORDER — IBUPROFEN 600 MG/1
600 TABLET ORAL EVERY 6 HOURS PRN
Status: DISCONTINUED | OUTPATIENT
Start: 2024-06-09 | End: 2024-06-10 | Stop reason: HOSPADM

## 2024-06-09 RX ORDER — OXYCODONE HYDROCHLORIDE 5 MG/1
5 TABLET ORAL EVERY 4 HOURS PRN
Status: DISCONTINUED | OUTPATIENT
Start: 2024-06-09 | End: 2024-06-10 | Stop reason: HOSPADM

## 2024-06-09 RX ADMIN — HEPARIN SODIUM 5000 UNITS: 5000 INJECTION INTRAVENOUS; SUBCUTANEOUS at 06:13

## 2024-06-09 RX ADMIN — METRONIDAZOLE 500 MG: 500 TABLET, FILM COATED ORAL at 18:01

## 2024-06-09 RX ADMIN — SODIUM CHLORIDE 75 ML/HR: 0.9 INJECTION, SOLUTION INTRAVENOUS at 06:12

## 2024-06-09 RX ADMIN — METRONIDAZOLE 500 MG: 500 TABLET, FILM COATED ORAL at 22:07

## 2024-06-09 RX ADMIN — METRONIDAZOLE 500 MG: 500 INJECTION, SOLUTION INTRAVENOUS at 00:35

## 2024-06-09 RX ADMIN — CIPROFLOXACIN HYDROCHLORIDE 500 MG: 500 TABLET, FILM COATED ORAL at 18:01

## 2024-06-09 RX ADMIN — CIPROFLOXACIN 400 MG: 400 INJECTION, SOLUTION INTRAVENOUS at 06:12

## 2024-06-09 RX ADMIN — METRONIDAZOLE 500 MG: 500 INJECTION, SOLUTION INTRAVENOUS at 09:30

## 2024-06-09 NOTE — PLAN OF CARE
Problem: PAIN - ADULT  Goal: Verbalizes/displays adequate comfort level or baseline comfort level  Description: Interventions:  - Encourage patient to monitor pain and request assistance  - Assess pain using appropriate pain scale  - Administer analgesics based on type and severity of pain and evaluate response  - Implement non-pharmacological measures as appropriate and evaluate response  - Consider cultural and social influences on pain and pain management  - Notify physician/advanced practitioner if interventions unsuccessful or patient reports new pain  Outcome: Progressing     Problem: INFECTION - ADULT  Goal: Absence or prevention of progression during hospitalization  Description: INTERVENTIONS:  - Assess and monitor for signs and symptoms of infection  - Monitor lab/diagnostic results  - Monitor all insertion sites, i.e. indwelling lines, tubes, and drains  - Monitor endotracheal if appropriate and nasal secretions for changes in amount and color  - [place] appropriate cooling/warming therapies per order  - Administer medications as ordered  - Instruct and encourage patient and family to use good hand hygiene technique  - Identify and instruct in appropriate isolation precautions for identified infection/condition  Outcome: Progressing  Goal: Absence of fever/infection during neutropenic period  Description: INTERVENTIONS:  - Monitor WBC    Outcome: Progressing     Problem: SAFETY ADULT  Goal: Patient will remain free of falls  Description: INTERVENTIONS:  - Educate patient/family on patient safety including physical limitations  - Instruct patient to call for assistance with activity   - Consult OT/PT to assist with strengthening/mobility   - Keep Call bell within reach  - Keep bed low and locked with side rails adjusted as appropriate  - Keep care items and personal belongings within reach  - Initiate and maintain comfort rounds  - Make Fall Risk Sign visible to staff  - Offer Toileting every    Hours,  in advance of need  - Initiate/Maintain   alarm  - Obtain necessary fall risk management equipment:     - Apply yellow socks and bracelet for high fall risk patients  - Consider moving patient to room near nurses station  Outcome: Progressing  Goal: Maintain or return to baseline ADL function  Description: INTERVENTIONS:  -  Assess patient's ability to carry out ADLs; assess patient's baseline for ADL function and identify physical deficits which impact ability to perform ADLs (bathing, care of mouth/teeth, toileting, grooming, dressing, etc.)  - Assess/evaluate cause of self-care deficits   - Assess range of motion  - Assess patient's mobility; develop plan if impaired  - Assess patient's need for assistive devices and provide as appropriate  - Encourage maximum independence but intervene and supervise when necessary  - Involve family in performance of ADLs  - Assess for home care needs following discharge   - Consider OT consult to assist with ADL evaluation and planning for discharge  - Provide patient education as appropriate  Outcome: Progressing  Goal: Maintains/Returns to pre admission functional level  Description: INTERVENTIONS:  - Perform AM-PAC 6 Click Basic Mobility/ Daily Activity assessment daily.  - Set and communicate daily mobility goal to care team and patient/family/caregiver.   - Collaborate with rehabilitation services on mobility goals if consulted  - Perform Range of Motion    times a day.  - Reposition patient every    hours.  - Dangle patient    times a day  - Stand patient    times a day  - Ambulate patient    times a day  - Out of bed to chair    times a day   - Out of bed for meals        times a day  - Out of bed for toileting  - Record patient progress and toleration of activity level   Outcome: Progressing     Problem: DISCHARGE PLANNING  Goal: Discharge to home or other facility with appropriate resources  Description: INTERVENTIONS:  - Identify barriers to discharge w/patient and  caregiver  - Arrange for needed discharge resources and transportation as appropriate  - Identify discharge learning needs (meds, wound care, etc.)  - Arrange for interpretive services to assist at discharge as needed  - Refer to Case Management Department for coordinating discharge planning if the patient needs post-hospital services based on physician/advanced practitioner order or complex needs related to functional status, cognitive ability, or social support system  Outcome: Progressing

## 2024-06-09 NOTE — PLAN OF CARE
Problem: PAIN - ADULT  Goal: Verbalizes/displays adequate comfort level or baseline comfort level  Description: Interventions:  - Encourage patient to monitor pain and request assistance  - Assess pain using appropriate pain scale  - Administer analgesics based on type and severity of pain and evaluate response  - Implement non-pharmacological measures as appropriate and evaluate response  - Consider cultural and social influences on pain and pain management  - Notify physician/advanced practitioner if interventions unsuccessful or patient reports new pain  Outcome: Progressing     Problem: INFECTION - ADULT  Goal: Absence or prevention of progression during hospitalization  Description: INTERVENTIONS:  - Assess and monitor for signs and symptoms of infection  - Monitor lab/diagnostic results  - Monitor all insertion sites, i.e. indwelling lines, tubes, and drains  - Monitor endotracheal if appropriate and nasal secretions for changes in amount and color  - [place] appropriate cooling/warming therapies per order  - Administer medications as ordered  - Instruct and encourage patient and family to use good hand hygiene technique  - Identify and instruct in appropriate isolation precautions for identified infection/condition  Outcome: Progressing  Goal: Absence of fever/infection during neutropenic period  Description: INTERVENTIONS:  - Monitor WBC    Outcome: Progressing     Problem: SAFETY ADULT  Goal: Patient will remain free of falls  Description: INTERVENTIONS:  - Educate patient/family on patient safety including physical limitations  - Instruct patient to call for assistance with activity   - Consult OT/PT to assist with strengthening/mobility   - Keep Call bell within reach  - Keep bed low and locked with side rails adjusted as appropriate  - Keep care items and personal belongings within reach  - Initiate and maintain comfort rounds  - Make Fall Risk Sign visible to staff  - Offer Toileting every 2 Hours,  in advance of need  - Initiate/Maintain alarm  - Obtain necessary fall risk management equipment:   - Apply yellow socks and bracelet for high fall risk patients  - Consider moving patient to room near nurses station  Outcome: Progressing  Goal: Maintain or return to baseline ADL function  Description: INTERVENTIONS:  -  Assess patient's ability to carry out ADLs; assess patient's baseline for ADL function and identify physical deficits which impact ability to perform ADLs (bathing, care of mouth/teeth, toileting, grooming, dressing, etc.)  - Assess/evaluate cause of self-care deficits   - Assess range of motion  - Assess patient's mobility; develop plan if impaired  - Assess patient's need for assistive devices and provide as appropriate  - Encourage maximum independence but intervene and supervise when necessary  - Involve family in performance of ADLs  - Assess for home care needs following discharge   - Consider OT consult to assist with ADL evaluation and planning for discharge  - Provide patient education as appropriate  Outcome: Progressing  Goal: Maintains/Returns to pre admission functional level  Description: INTERVENTIONS:  - Perform AM-PAC 6 Click Basic Mobility/ Daily Activity assessment daily.  - Set and communicate daily mobility goal to care team and patient/family/caregiver.   - Collaborate with rehabilitation services on mobility goals if consulted  - Perform Range of Motion 3 times a day.  - Reposition patient every 2 hours.  - Dangle patient 3 times a day  - Stand patient 3 times a day  - Ambulate patient 3 times a day  - Out of bed to chair 3 times a day   - Out of bed for meals 3 times a day  - Out of bed for toileting  - Record patient progress and toleration of activity level   Outcome: Progressing     Problem: DISCHARGE PLANNING  Goal: Discharge to home or other facility with appropriate resources  Description: INTERVENTIONS:  - Identify barriers to discharge w/patient and caregiver  -  Arrange for needed discharge resources and transportation as appropriate  - Identify discharge learning needs (meds, wound care, etc.)  - Arrange for interpretive services to assist at discharge as needed  - Refer to Case Management Department for coordinating discharge planning if the patient needs post-hospital services based on physician/advanced practitioner order or complex needs related to functional status, cognitive ability, or social support system  Outcome: Progressing

## 2024-06-09 NOTE — PLAN OF CARE
Problem: PAIN - ADULT  Goal: Verbalizes/displays adequate comfort level or baseline comfort level  Description: Interventions:  - Encourage patient to monitor pain and request assistance  - Assess pain using appropriate pain scale  - Administer analgesics based on type and severity of pain and evaluate response  - Implement non-pharmacological measures as appropriate and evaluate response  - Consider cultural and social influences on pain and pain management  - Notify physician/advanced practitioner if interventions unsuccessful or patient reports new pain  Outcome: Progressing     Problem: INFECTION - ADULT  Goal: Absence or prevention of progression during hospitalization  Description: INTERVENTIONS:  - Assess and monitor for signs and symptoms of infection  - Monitor lab/diagnostic results  - Monitor all insertion sites, i.e. indwelling lines, tubes, and drains  - Monitor endotracheal if appropriate and nasal secretions for changes in amount and color  - [place] appropriate cooling/warming therapies per order  - Administer medications as ordered  - Instruct and encourage patient and family to use good hand hygiene technique  - Identify and instruct in appropriate isolation precautions for identified infection/condition  Outcome: Progressing

## 2024-06-09 NOTE — PROGRESS NOTES
"Progress Note - General Surgery   Michael Velazquez 49 y.o. male MRN: [id number]  Unit/Bed#: -01 Encounter: [id number]    Assessment:   50yo M HD#3 admitted with acute diverticulitis with contained microperforation  -Afebrile, VSS and WNL  -WBC 9 (15,15)  -Tbili 1.4 (2.4)  -No hx of prior episodes of diverticulitis  -screening colonoscopy last year with no findings per pt  -pain/tenderness remains mild and in the LLQ pain -having  BMs    Plan:  -Advance to low-residue soft surgical diet  -Convert to oral abx and pain meds  -DC IVF  -continue to monitor off abx, anticipated discharge in 24 hrs if doing well  Repeat CBC in am  Serial exams  DVT ppx, heparin    Subjective: He feels much better today. His pain is improved, not even requiring pain medicine. Denies nausea, vomiting. Had a nl BM this morning. Urinating without difficulty.    Objective:   General: VSS, NAD, alert, pleasant  Lungs nl respiratory effort  Heart RRR   Abd soft, no distension, mild tenderness with deep palpation in the LLQ, otherwise abd NT, no masses or guarding  Extremities: FAROM with good strength equal bilaterally, no edema  Neuro: A&Ox3, affect appropriate, distal sensation and muscular strength intact      Blood pressure 137/79, pulse 76, temperature 98.1 °F (36.7 °C), temperature source Temporal, resp. rate 18, height 5' 3\" (1.6 m), weight 113 kg (250 lb), SpO2 97%.,Body mass index is 44.29 kg/m².      Intake/Output Summary (Last 24 hours) at 6/9/2024 0849  Last data filed at 6/8/2024 1623  Gross per 24 hour   Intake 2440.83 ml   Output --   Net 2440.83 ml       Invasive Devices       Peripheral Intravenous Line  Duration             Peripheral IV 06/07/24 Dorsal (posterior);Left Hand 1 day                  Lab, Imaging and other studies:CBC:   Lab Results   Component Value Date    WBC 9.70 06/09/2024    HGB 14.6 06/09/2024    HCT 44.0 06/09/2024    MCV 88 06/09/2024     06/09/2024    RBC 5.02 06/09/2024    MCH 29.1 06/09/2024 " "   MCHC 33.2 06/09/2024    RDW 12.9 06/09/2024    MPV 9.5 06/09/2024    NRBC 0 06/09/2024   , CMP:   Lab Results   Component Value Date    SODIUM 138 06/09/2024    K 3.9 06/09/2024     06/09/2024    CO2 27 06/09/2024    BUN 10 06/09/2024    CREATININE 0.99 06/09/2024    CALCIUM 8.9 06/09/2024    AST 14 06/09/2024    ALT 13 06/09/2024    ALKPHOS 51 06/09/2024    EGFR 89 06/09/2024   , Coagulation: No results found for: \"PT\", \"INR\", \"APTT\", Urinalysis:   No results found for: \"COLORU\", \"CLARITYU\", \"SPECGRAV\", \"PHUR\", \"LEUKOCYTESUR\", \"NITRITE\", \"PROTEINUA\", \"GLUCOSEU\", \"KETONESU\", \"BILIRUBINUR\", \"BLOODU\"  , Amylase: No results found for: \"AMYLASE\", Lipase:   No results found for: \"LIPASE\"    VTE Pharmacologic Prophylaxis: subcu heparin  VTE Mechanical Prophylaxis: sequential compression device      "

## 2024-06-10 VITALS
TEMPERATURE: 97.8 F | SYSTOLIC BLOOD PRESSURE: 160 MMHG | OXYGEN SATURATION: 96 % | BODY MASS INDEX: 44.3 KG/M2 | HEIGHT: 63 IN | DIASTOLIC BLOOD PRESSURE: 80 MMHG | RESPIRATION RATE: 16 BRPM | HEART RATE: 70 BPM | WEIGHT: 250 LBS

## 2024-06-10 PROBLEM — K57.92 DIVERTICULITIS: Status: ACTIVE | Noted: 2024-06-10

## 2024-06-10 LAB
BASOPHILS # BLD AUTO: 0.04 THOUSANDS/ÂΜL (ref 0–0.1)
BASOPHILS NFR BLD AUTO: 1 % (ref 0–1)
EOSINOPHIL # BLD AUTO: 0.3 THOUSAND/ÂΜL (ref 0–0.61)
EOSINOPHIL NFR BLD AUTO: 3 % (ref 0–6)
ERYTHROCYTE [DISTWIDTH] IN BLOOD BY AUTOMATED COUNT: 12.8 % (ref 11.6–15.1)
HCT VFR BLD AUTO: 43 % (ref 36.5–49.3)
HGB BLD-MCNC: 14.4 G/DL (ref 12–17)
IMM GRANULOCYTES # BLD AUTO: 0.03 THOUSAND/UL (ref 0–0.2)
IMM GRANULOCYTES NFR BLD AUTO: 0 % (ref 0–2)
LYMPHOCYTES # BLD AUTO: 2.45 THOUSANDS/ÂΜL (ref 0.6–4.47)
LYMPHOCYTES NFR BLD AUTO: 28 % (ref 14–44)
MCH RBC QN AUTO: 29.7 PG (ref 26.8–34.3)
MCHC RBC AUTO-ENTMCNC: 33.5 G/DL (ref 31.4–37.4)
MCV RBC AUTO: 89 FL (ref 82–98)
MONOCYTES # BLD AUTO: 0.87 THOUSAND/ÂΜL (ref 0.17–1.22)
MONOCYTES NFR BLD AUTO: 10 % (ref 4–12)
NEUTROPHILS # BLD AUTO: 5.06 THOUSANDS/ÂΜL (ref 1.85–7.62)
NEUTS SEG NFR BLD AUTO: 58 % (ref 43–75)
NRBC BLD AUTO-RTO: 0 /100 WBCS
PLATELET # BLD AUTO: 261 THOUSANDS/UL (ref 149–390)
PMV BLD AUTO: 9.3 FL (ref 8.9–12.7)
RBC # BLD AUTO: 4.85 MILLION/UL (ref 3.88–5.62)
WBC # BLD AUTO: 8.75 THOUSAND/UL (ref 4.31–10.16)

## 2024-06-10 PROCEDURE — 85025 COMPLETE CBC W/AUTO DIFF WBC: CPT | Performed by: PHYSICIAN ASSISTANT

## 2024-06-10 RX ORDER — CIPROFLOXACIN 500 MG/1
500 TABLET, FILM COATED ORAL EVERY 12 HOURS SCHEDULED
Qty: 20 TABLET | Refills: 0 | Status: ON HOLD | OUTPATIENT
Start: 2024-06-10 | End: 2024-06-21

## 2024-06-10 RX ORDER — METRONIDAZOLE 500 MG/1
500 TABLET ORAL EVERY 8 HOURS SCHEDULED
Qty: 30 TABLET | Refills: 0 | Status: ON HOLD | OUTPATIENT
Start: 2024-06-10 | End: 2024-06-21

## 2024-06-10 RX ADMIN — CIPROFLOXACIN HYDROCHLORIDE 500 MG: 500 TABLET, FILM COATED ORAL at 08:04

## 2024-06-10 RX ADMIN — METRONIDAZOLE 500 MG: 500 TABLET, FILM COATED ORAL at 05:32

## 2024-06-10 NOTE — PLAN OF CARE
Problem: PAIN - ADULT  Goal: Verbalizes/displays adequate comfort level or baseline comfort level  Description: Interventions:  - Encourage patient to monitor pain and request assistance  - Assess pain using appropriate pain scale  - Administer analgesics based on type and severity of pain and evaluate response  - Implement non-pharmacological measures as appropriate and evaluate response  - Consider cultural and social influences on pain and pain management  - Notify physician/advanced practitioner if interventions unsuccessful or patient reports new pain  Outcome: Progressing     Problem: INFECTION - ADULT  Goal: Absence or prevention of progression during hospitalization  Description: INTERVENTIONS:  - Assess and monitor for signs and symptoms of infection  - Monitor lab/diagnostic results  - Monitor all insertion sites, i.e. indwelling lines, tubes, and drains  - Monitor endotracheal if appropriate and nasal secretions for changes in amount and color  - [place] appropriate cooling/warming therapies per order  - Administer medications as ordered  - Instruct and encourage patient and family to use good hand hygiene technique  - Identify and instruct in appropriate isolation precautions for identified infection/condition  Outcome: Progressing  Goal: Absence of fever/infection during neutropenic period  Description: INTERVENTIONS:  - Monitor WBC    Outcome: Progressing     Problem: SAFETY ADULT  Goal: Patient will remain free of falls  Description: INTERVENTIONS:  - Educate patient/family on patient safety including physical limitations  - Instruct patient to call for assistance with activity   - Consult OT/PT to assist with strengthening/mobility   - Keep Call bell within reach  - Keep bed low and locked with side rails adjusted as appropriate  - Keep care items and personal belongings within reach  - Initiate and maintain comfort rounds  - Make Fall Risk Sign visible to staff  - Offer Toileting every    Hours,  in advance of need  - Initiate/Maintain   alarm  - Obtain necessary fall risk management equipment:     - Apply yellow socks and bracelet for high fall risk patients  - Consider moving patient to room near nurses station  Outcome: Progressing  Goal: Maintain or return to baseline ADL function  Description: INTERVENTIONS:  -  Assess patient's ability to carry out ADLs; assess patient's baseline for ADL function and identify physical deficits which impact ability to perform ADLs (bathing, care of mouth/teeth, toileting, grooming, dressing, etc.)  - Assess/evaluate cause of self-care deficits   - Assess range of motion  - Assess patient's mobility; develop plan if impaired  - Assess patient's need for assistive devices and provide as appropriate  - Encourage maximum independence but intervene and supervise when necessary  - Involve family in performance of ADLs  - Assess for home care needs following discharge   - Consider OT consult to assist with ADL evaluation and planning for discharge  - Provide patient education as appropriate  Outcome: Progressing  Goal: Maintains/Returns to pre admission functional level  Description: INTERVENTIONS:  - Perform AM-PAC 6 Click Basic Mobility/ Daily Activity assessment daily.  - Set and communicate daily mobility goal to care team and patient/family/caregiver.   - Collaborate with rehabilitation services on mobility goals if consulted  - Perform Range of Motion    times a day.  - Reposition patient every    hours.  - Dangle patient    times a day  - Stand patient    times a day  - Ambulate patient    times a day  - Out of bed to chair    times a day   - Out of bed for meals            times a day  - Out of bed for toileting  - Record patient progress and toleration of activity level   Outcome: Progressing     Problem: DISCHARGE PLANNING  Goal: Discharge to home or other facility with appropriate resources  Description: INTERVENTIONS:  - Identify barriers to discharge w/patient and  caregiver  - Arrange for needed discharge resources and transportation as appropriate  - Identify discharge learning needs (meds, wound care, etc.)  - Arrange for interpretive services to assist at discharge as needed  - Refer to Case Management Department for coordinating discharge planning if the patient needs post-hospital services based on physician/advanced practitioner order or complex needs related to functional status, cognitive ability, or social support system  Outcome: Progressing

## 2024-06-10 NOTE — PROGRESS NOTES
"Progress Note - General Surgery   Michael Velazquez 49 y.o. male MRN: [id number]  Unit/Bed#: -01 Encounter: [id number]    Assessment:  49 y.o. male HD 4 admitted with acute diverticulitis and contained microperforation    - Afebrile, VSS on room air, episodes of HTN   - Leukocytosis resolved - 8 (9, 15, 15)  - Hgb 14 (14, 13, 14)  - BMP WNL 6/9  - No prior hx of diverticulitis  - Screening colonoscopy last year with no findings per pt  - First formed BM     Plan:  - Doing well, benign abdomen to light palpation, discharge planning for today  - Continue oral abx on d/c - Cipro/Flagyl  - Diet as tolerated - low fiber/low residue information provided  - Patient aware of return precautions  - General surgery contact information provided in chart     Subjective:   Patient states he is doing well today. He was able to have a formed BM and continues to pass some flatus. Patient admits to some discomfort with deep palpation in the lower abdomen but admits he may be pressing quite hard to elicit this. He denies fevers, chills, nausea, or vomiting. He tolerated a diet without concern and is ready to return home.     Objective:   Blood pressure 160/80, pulse 70, temperature 97.8 °F (36.6 °C), temperature source Temporal, resp. rate 16, height 5' 3\" (1.6 m), weight 113 kg (250 lb), SpO2 96%.,Body mass index is 44.29 kg/m².    Intake/Output Summary (Last 24 hours) at 6/10/2024 0800  Last data filed at 6/9/2024 2121  Gross per 24 hour   Intake 1799.17 ml   Output --   Net 1799.17 ml     Invasive Devices       Peripheral Intravenous Line  Duration             Peripheral IV 06/07/24 Dorsal (posterior);Left Hand 2 days                  Physical Exam:   General: no acute distress, pt appears well and comfortable, out of bed walking in room   Skin: warm and dry to touch  Pulmonary: normal effort  Abdominal: soft, non-distended, non-tender to light palpation, admits to some tenderness with deep palpation in suprapubic/LLQ area, no " "guarding or rebound  Musculoskeletal: no LE edema present  Neuro: alert and oriented     Lab, Imaging and other studies:I have personally reviewed pertinent lab results.  , CBC:   Lab Results   Component Value Date    WBC 8.75 06/10/2024    HGB 14.4 06/10/2024    HCT 43.0 06/10/2024    MCV 89 06/10/2024     06/10/2024    RBC 4.85 06/10/2024    MCH 29.7 06/10/2024    MCHC 33.5 06/10/2024    RDW 12.8 06/10/2024    MPV 9.3 06/10/2024    NRBC 0 06/10/2024   , CMP: No results found for: \"SODIUM\", \"K\", \"CL\", \"CO2\", \"ANIONGAP\", \"BUN\", \"CREATININE\", \"GLUCOSE\", \"CALCIUM\", \"AST\", \"ALT\", \"ALKPHOS\", \"PROT\", \"BILITOT\", \"EGFR\"  VTE Pharmacologic Prophylaxis: Heparin  VTE Mechanical Prophylaxis: sequential compression device    Katrina Elizabeth Billig, PA-C  6/10/2024  "

## 2024-06-10 NOTE — UTILIZATION REVIEW
NOTIFICATION OF INPATIENT ADMISSION   AUTHORIZATION REQUEST   SERVICING FACILITY:   [place] [address]  Tax ID: [id number]  NPI: [id number] ATTENDING PROVIDER:  Attending Name and NPI#: Nani Woo Do [[id number]]  Address: [address] [place] [address]  Phone: [phone]   ADMISSION INFORMATION:  Place of Service: Inpatient [facility]  Place of Service Code: 21  Inpatient Admission Date/Time: 6/7/24  5:44 PM  Discharge Date/Time: No discharge date for patient encounter.  Admitting Diagnosis Code/Description:  Abdominal pain [R10.9]  Diverticulitis of large intestine with perforation without bleeding [K57.20]     UTILIZATION REVIEW CONTACT:  Celia Viera Utilization   Network Utilization Review Department  Phone: [phone]  Fax [phone]  Email: [email or web address]  Contact for approvals/pending authorizations, clinical reviews, and discharge.     PHYSICIAN ADVISORY SERVICES:  Medical Necessity Denial & Cpdn-pb-Rdsp Review  Phone: [phone]  Fax: [phone]  Email: [email or web address]     DISCHARGE SUPPORT TEAM:  For Patients Discharge Needs & Updates  Phone: [phone] opt. 2 Fax: [phone]  Email: [email or web address]

## 2024-06-10 NOTE — PLAN OF CARE
Problem: PAIN - ADULT  Goal: Verbalizes/displays adequate comfort level or baseline comfort level  Description: Interventions:  - Encourage patient to monitor pain and request assistance  - Assess pain using appropriate pain scale  - Administer analgesics based on type and severity of pain and evaluate response  - Implement non-pharmacological measures as appropriate and evaluate response  - Consider cultural and social influences on pain and pain management  - Notify physician/advanced practitioner if interventions unsuccessful or patient reports new pain  6/10/2024 1312 by Leena Ureña RN  Outcome: Adequate for Discharge  6/10/2024 0716 by Leena Ureña RN  Outcome: Progressing     Problem: INFECTION - ADULT  Goal: Absence or prevention of progression during hospitalization  Description: INTERVENTIONS:  - Assess and monitor for signs and symptoms of infection  - Monitor lab/diagnostic results  - Monitor all insertion sites, i.e. indwelling lines, tubes, and drains  - Monitor endotracheal if appropriate and nasal secretions for changes in amount and color  - [place] appropriate cooling/warming therapies per order  - Administer medications as ordered  - Instruct and encourage patient and family to use good hand hygiene technique  - Identify and instruct in appropriate isolation precautions for identified infection/condition  6/10/2024 1312 by Leena Ureña RN  Outcome: Adequate for Discharge  6/10/2024 0716 by Leena Ureña RN  Outcome: Progressing  Goal: Absence of fever/infection during neutropenic period  Description: INTERVENTIONS:  - Monitor WBC    6/10/2024 1312 by Leena Ureña RN  Outcome: Adequate for Discharge  6/10/2024 0716 by Leena Ureña RN  Outcome: Progressing     Problem: SAFETY ADULT  Goal: Patient will remain free of falls  Description: INTERVENTIONS:  - Educate patient/family on patient safety including physical limitations  - Instruct patient to call for assistance  with activity   - Consult OT/PT to assist with strengthening/mobility   - Keep Call bell within reach  - Keep bed low and locked with side rails adjusted as appropriate  - Keep care items and personal belongings within reach  - Initiate and maintain comfort rounds  - Make Fall Risk Sign visible to staff  - Offer Toileting every    Hours, in advance of need  - Initiate/Maintain   alarm  - Obtain necessary fall risk management equipment:     - Apply yellow socks and bracelet for high fall risk patients  - Consider moving patient to room near nurses station  6/10/2024 1312 by Leena Ureña RN  Outcome: Adequate for Discharge  6/10/2024 0716 by Leena Ureña RN  Outcome: Progressing  Goal: Maintain or return to baseline ADL function  Description: INTERVENTIONS:  -  Assess patient's ability to carry out ADLs; assess patient's baseline for ADL function and identify physical deficits which impact ability to perform ADLs (bathing, care of mouth/teeth, toileting, grooming, dressing, etc.)  - Assess/evaluate cause of self-care deficits   - Assess range of motion  - Assess patient's mobility; develop plan if impaired  - Assess patient's need for assistive devices and provide as appropriate  - Encourage maximum independence but intervene and supervise when necessary  - Involve family in performance of ADLs  - Assess for home care needs following discharge   - Consider OT consult to assist with ADL evaluation and planning for discharge  - Provide patient education as appropriate  6/10/2024 1312 by Leena Ureña RN  Outcome: Adequate for Discharge  6/10/2024 0716 by Leena Ureña RN  Outcome: Progressing  Goal: Maintains/Returns to pre admission functional level  Description: INTERVENTIONS:  - Perform AM-PAC 6 Click Basic Mobility/ Daily Activity assessment daily.  - Set and communicate daily mobility goal to care team and patient/family/caregiver.   - Collaborate with rehabilitation services on mobility goals if  consulted  - Perform Range of Motion    times a day.  - Reposition patient every    hours.  - Dangle patient    times a day  - Stand patient    times a day  - Ambulate patient    times a day  - Out of bed to chair    times a day   - Out of bed for meals            times a day  - Out of bed for toileting  - Record patient progress and toleration of activity level   6/10/2024 1312 by Leena Ureña RN  Outcome: Adequate for Discharge  6/10/2024 0716 by Leena Ureña RN  Outcome: Progressing     Problem: DISCHARGE PLANNING  Goal: Discharge to home or other facility with appropriate resources  Description: INTERVENTIONS:  - Identify barriers to discharge w/patient and caregiver  - Arrange for needed discharge resources and transportation as appropriate  - Identify discharge learning needs (meds, wound care, etc.)  - Arrange for interpretive services to assist at discharge as needed  - Refer to Case Management Department for coordinating discharge planning if the patient needs post-hospital services based on physician/advanced practitioner order or complex needs related to functional status, cognitive ability, or social support system  6/10/2024 1312 by Leena Ureña RN  Outcome: Adequate for Discharge  6/10/2024 0716 by eLena Ureña RN  Outcome: Progressing

## 2024-06-10 NOTE — NURSING NOTE
Iv dcd. Dc instructions reviewed by this nurse. No f/u questions. Pt given written information on diet rt diverticulitis. Dcd with daughter

## 2024-06-11 NOTE — UTILIZATION REVIEW
NOTIFICATION OF ADMISSION DISCHARGE   This is a Notification of Discharge from [facility]. Please be advised that this patient has been discharge from our facility. Below you will find the admission and discharge date and time including the patient’s disposition.   UTILIZATION REVIEW CONTACT:  Celia Viera  Utilization   Network Utilization Review Department  Phone: [phone] x carefully listen to the prompts. All voicemails are confidential.  Email: [email or web address]     ADMISSION INFORMATION  PRESENTATION DATE: 6/7/2024  2:46 PM  OBERVATION ADMISSION DATE:   INPATIENT ADMISSION DATE: 6/7/24  5:44 PM   DISCHARGE DATE: 6/10/2024  1:13 PM   DISPOSITION:Home/Self Care    Network Utilization Review Department  ATTENTION: Please call with any questions or concerns to [phone] and carefully listen to the prompts so that you are directed to the right person. All voicemails are confidential.   For Discharge needs, contact Care Management DC Support Team at [phone] opt. [phone]  Send all requests for admission clinical reviews, approved or denied determinations and any other requests to dedicated fax number below belonging to the campus where the patient is receiving treatment. List of dedicated fax numbers for the Facilities:  FACILITY NAME UR FAX NUMBER   ADMISSION DENIALS (Administrative/Medical Necessity) [phone]   DISCHARGE SUPPORT TEAM ([facility]) [phone]   PARENT CHILD HEALTH (Maternity/NICU/Pediatrics) [phone]   [facility] [phone]   [facility] [phone]   [facility] [phone]   [facility] [phone]   [facility] [phone]   [facility] [phone]   [facility] [phone]   [facility]  [phone]   [facility] [phone]   [facility] [phone]   [facility] [phone]   [facility] [phone]

## 2024-06-11 NOTE — DISCHARGE SUMMARY
Discharge Summary - Michael Velazquez 49 y.o. male MRN: [id number]  Unit/Bed#: -01 Encounter: [id number]    Admission Date:   Admission Orders (From admission, onward)       Ordered        06/07/24 1744  INPATIENT ADMISSION  Once            06/07/24 1749  Inpatient Admission  Once                          Admitting Diagnosis: Abdominal pain [R10.9]  Diverticulitis of large intestine with perforation without bleeding [K57.20]    HPI: Per H&P performed 6/7: Michael Velazquez is a 49 y.o. male who presents with lower abdominal pain.  The patient states he has had significant pain in the lower abdomen for the past 2 days.  Along with the pain he did have chills.  He denies any nausea or vomiting.  His appetite is decreased.  He felt as though he was constipated, he took MiraLAX and did have multiple bowel movements.  He has never had a pain like this in the past.  The patient tells me he had a colonoscopy last year at an outside facility.  No significant abnormality was noted.     Procedures Performed: No orders of the defined types were placed in this encounter.    Summary of Hospital Course: Patient presented 6/7 with above complaint. Below treatment plan. Hospital Day 2 he was doing well and was able to be advanced to clear liquid diet with continued antibiotics. He was moving his bowels without issue and urinating with no difficulty. Hospital day 3 white blood count normalized to 9 from 15.  He had minimal remaining pain and tenderness in the left lower quadrant and had been moving his bowels.  Patient was advanced to low residue, surgical soft diet and converted to oral medications.  By hospital day 4 patient continued to do well, white blood count remained normal, patient was moving his bowels, ambulating, and urinating without issue.  He was deemed stable for discharge.  Alert symptoms and reasons to return to the hospital were reviewed, patient to follow-up with PCP, and office phone number provided for him to  reach out as needed.    Significant Findings, Care, Treatment and Services Provided: Conservative management with IV abx, bowel rest, pain control, slow diet advancement and monitoring for tolerance     Complications: None    Discharge Diagnosis: Abdominal pain [R10.9]  Diverticulitis of large intestine with perforation without bleeding [K57.20]    Condition at Discharge: good     Discharge instructions/Information to patient and family:   See after visit summary for information provided to patient and family.      Provisions for Follow-Up Care:  See after visit summary for information related to follow-up care and any pertinent home health orders.      PCP: No primary care provider on file.    Disposition: Home    Planned Readmission: No    Discharge Statement   I spent 30 minutes discharging the patient. This time was spent on the day of discharge. I had direct contact with the patient on the day of discharge. Additional documentation is required if more than 30 minutes were spent on discharge.     Discharge Medications:  See after visit summary for reconciled discharge medications provided to patient and family.       Katrina Elizabeth Billig, PA-C  6/11/2024

## 2024-06-13 ENCOUNTER — APPOINTMENT (EMERGENCY)
Dept: CT IMAGING | Facility: HOSPITAL | Age: 49
End: 2024-06-13
Payer: COMMERCIAL

## 2024-06-13 ENCOUNTER — HOSPITAL ENCOUNTER (EMERGENCY)
Facility: HOSPITAL | Age: 49
Discharge: HOME | End: 2024-06-14
Attending: STUDENT IN AN ORGANIZED HEALTH CARE EDUCATION/TRAINING PROGRAM
Payer: COMMERCIAL

## 2024-06-13 DIAGNOSIS — K57.92 DIVERTICULITIS: Primary | ICD-10-CM

## 2024-06-13 LAB
ALBUMIN SERPL BCP-MCNC: 4.2 G/DL (ref 3.5–5)
ALP SERPL-CCNC: 50 U/L (ref 34–104)
ALT SERPL W P-5'-P-CCNC: 66 U/L (ref 7–52)
ANION GAP SERPL CALCULATED.3IONS-SCNC: 9 MMOL/L (ref 4–13)
APTT PPP: 27 SECONDS (ref 23–37)
AST SERPL W P-5'-P-CCNC: 53 U/L (ref 13–39)
BACTERIA UR QL AUTO: ABNORMAL /HPF
BASOPHILS # BLD AUTO: 0.05 THOUSANDS/ÂΜL (ref 0–0.1)
BASOPHILS NFR BLD AUTO: 1 % (ref 0–1)
BILIRUB SERPL-MCNC: 0.96 MG/DL (ref 0.2–1)
BILIRUB UR QL STRIP: ABNORMAL
BUN SERPL-MCNC: 16 MG/DL (ref 5–25)
CALCIUM SERPL-MCNC: 9.2 MG/DL (ref 8.4–10.2)
CHLORIDE SERPL-SCNC: 100 MMOL/L (ref 96–108)
CLARITY UR: CLEAR
CO2 SERPL-SCNC: 29 MMOL/L (ref 21–32)
COLOR UR: YELLOW
CREAT SERPL-MCNC: 1.2 MG/DL (ref 0.6–1.3)
EOSINOPHIL # BLD AUTO: 0.23 THOUSAND/ÂΜL (ref 0–0.61)
EOSINOPHIL NFR BLD AUTO: 2 % (ref 0–6)
ERYTHROCYTE [DISTWIDTH] IN BLOOD BY AUTOMATED COUNT: 12.8 % (ref 11.6–15.1)
GFR SERPL CREATININE-BSD FRML MDRD: 70 ML/MIN/1.73SQ M
GLUCOSE SERPL-MCNC: 99 MG/DL (ref 65–140)
GLUCOSE UR STRIP-MCNC: NEGATIVE MG/DL
HCT VFR BLD AUTO: 44.2 % (ref 36.5–49.3)
HGB BLD-MCNC: 14.7 G/DL (ref 12–17)
HGB UR QL STRIP.AUTO: ABNORMAL
HYALINE CASTS #/AREA URNS LPF: ABNORMAL /LPF
IMM GRANULOCYTES # BLD AUTO: 0.07 THOUSAND/UL (ref 0–0.2)
IMM GRANULOCYTES NFR BLD AUTO: 1 % (ref 0–2)
INR PPP: 1.01 (ref 0.84–1.19)
KETONES UR STRIP-MCNC: ABNORMAL MG/DL
LACTATE SERPL-SCNC: 1.4 MMOL/L (ref 0.5–2)
LEUKOCYTE ESTERASE UR QL STRIP: ABNORMAL
LIPASE SERPL-CCNC: 15 U/L (ref 11–82)
LYMPHOCYTES # BLD AUTO: 3.05 THOUSANDS/ÂΜL (ref 0.6–4.47)
LYMPHOCYTES NFR BLD AUTO: 29 % (ref 14–44)
MCH RBC QN AUTO: 29.2 PG (ref 26.8–34.3)
MCHC RBC AUTO-ENTMCNC: 33.3 G/DL (ref 31.4–37.4)
MCV RBC AUTO: 88 FL (ref 82–98)
MONOCYTES # BLD AUTO: 1.02 THOUSAND/ÂΜL (ref 0.17–1.22)
MONOCYTES NFR BLD AUTO: 10 % (ref 4–12)
NEUTROPHILS # BLD AUTO: 6.27 THOUSANDS/ÂΜL (ref 1.85–7.62)
NEUTS SEG NFR BLD AUTO: 57 % (ref 43–75)
NITRITE UR QL STRIP: NEGATIVE
NON-SQ EPI CELLS URNS QL MICRO: ABNORMAL /HPF
NRBC BLD AUTO-RTO: 0 /100 WBCS
PH UR STRIP.AUTO: 5.5 [PH]
PLATELET # BLD AUTO: 302 THOUSANDS/UL (ref 149–390)
PMV BLD AUTO: 9.7 FL (ref 8.9–12.7)
POTASSIUM SERPL-SCNC: 3.7 MMOL/L (ref 3.5–5.3)
PROT SERPL-MCNC: 8.2 G/DL (ref 6.4–8.4)
PROT UR STRIP-MCNC: NEGATIVE MG/DL
PROTHROMBIN TIME: 13.6 SECONDS (ref 11.6–14.5)
RBC # BLD AUTO: 5.04 MILLION/UL (ref 3.88–5.62)
RBC #/AREA URNS AUTO: ABNORMAL /HPF
SODIUM SERPL-SCNC: 138 MMOL/L (ref 135–147)
SP GR UR STRIP.AUTO: >=1.03 (ref 1–1.03)
UROBILINOGEN UR QL STRIP.AUTO: 0.2 E.U./DL
WBC # BLD AUTO: 10.69 THOUSAND/UL (ref 4.31–10.16)
WBC #/AREA URNS AUTO: ABNORMAL /HPF

## 2024-06-13 PROCEDURE — 83605 ASSAY OF LACTIC ACID: CPT | Performed by: EMERGENCY MEDICINE

## 2024-06-13 PROCEDURE — 85610 PROTHROMBIN TIME: CPT | Performed by: EMERGENCY MEDICINE

## 2024-06-13 PROCEDURE — 83690 ASSAY OF LIPASE: CPT | Performed by: EMERGENCY MEDICINE

## 2024-06-13 PROCEDURE — 99285 EMERGENCY DEPT VISIT HI MDM: CPT | Performed by: EMERGENCY MEDICINE

## 2024-06-13 PROCEDURE — 74177 CT ABD & PELVIS W/CONTRAST: CPT

## 2024-06-13 PROCEDURE — 80053 COMPREHEN METABOLIC PANEL: CPT | Performed by: EMERGENCY MEDICINE

## 2024-06-13 PROCEDURE — 36415 COLL VENOUS BLD VENIPUNCTURE: CPT | Performed by: EMERGENCY MEDICINE

## 2024-06-13 PROCEDURE — 85025 COMPLETE CBC W/AUTO DIFF WBC: CPT | Performed by: EMERGENCY MEDICINE

## 2024-06-13 PROCEDURE — 81001 URINALYSIS AUTO W/SCOPE: CPT | Performed by: EMERGENCY MEDICINE

## 2024-06-13 PROCEDURE — 85730 THROMBOPLASTIN TIME PARTIAL: CPT | Performed by: EMERGENCY MEDICINE

## 2024-06-13 RX ORDER — ONDANSETRON 2 MG/ML
4 INJECTION INTRAMUSCULAR; INTRAVENOUS ONCE
Status: COMPLETED | OUTPATIENT
Start: 2024-06-13 | End: 2024-06-13

## 2024-06-13 RX ADMIN — ONDANSETRON 4 MG: 2 INJECTION INTRAMUSCULAR; INTRAVENOUS at 22:39

## 2024-06-13 RX ADMIN — IOHEXOL 100 ML: 350 INJECTION, SOLUTION INTRAVENOUS at 23:28

## 2024-06-13 RX ADMIN — SODIUM CHLORIDE 1000 ML: 0.9 INJECTION, SOLUTION INTRAVENOUS at 22:39

## 2024-06-13 RX ADMIN — ONDANSETRON 4 MG: 2 INJECTION INTRAMUSCULAR; INTRAVENOUS at 22:56

## 2024-06-13 NOTE — Clinical Note
Michael Velazquez was seen and treated in our emergency department on 6/13/2024.                Diagnosis:     Edward  .    He may return on this date:          If you have any questions or concerns, please don't hesitate to call.      Remberto Oropeza, DO    ______________________________           _______________          _______________  Hospital Representative                              Date                                Time

## 2024-06-13 NOTE — Clinical Note
Charles Velazquez accompanied Thomassandrine Velazquez to the emergency department on 6/13/2024.    Return date if applicable:         If you have any questions or concerns, please don't hesitate to call.      Remberto Oropeza, DO

## 2024-06-14 VITALS
SYSTOLIC BLOOD PRESSURE: 143 MMHG | BODY MASS INDEX: 43.71 KG/M2 | HEIGHT: 63 IN | TEMPERATURE: 97.4 F | WEIGHT: 246.69 LBS | RESPIRATION RATE: 18 BRPM | HEART RATE: 78 BPM | DIASTOLIC BLOOD PRESSURE: 81 MMHG | OXYGEN SATURATION: 97 %

## 2024-06-14 NOTE — DISCHARGE INSTRUCTIONS
Continue antibiotics as prescribed low fiber low residue diet and follow-up with primary physician and general surgery.

## 2024-06-14 NOTE — ED PROVIDER NOTES
History  Chief Complaint   Patient presents with    Abdominal Pain     Patient was here last Friday. Admitted for diverticulitis. Discharged home on Monday. Discharged w/ cipro and flagyl. Returning this evening d/t nausea and increasing lower abdominal pain. Feels like he is constipated.      Patient is a 49-year-old male history of diverticulitis recently hospitalized for the same about a week ago discharged on Cipro and Flagyl which she has been taking presents the emergency department due to 1 day of lower abdominal pain cramping bloating and nausea no vomiting no diarrhea no constipation has had bowel movements but volume of bowel movements has been decreased no fevers or chills.        History provided by:  Patient  Abdominal Pain  Pain location:  Suprapubic, LLQ and RLQ  Pain quality: aching, bloating and cramping    Associated symptoms: nausea    Associated symptoms: no chest pain, no chills, no constipation, no cough, no diarrhea, no fever, no shortness of breath and no vomiting        Prior to Admission Medications   Prescriptions Last Dose Informant Patient Reported? Taking?   Cholecalciferol (VITAMIN D3) 1,000 units tablet   Yes No   Sig: Take 1,000 Units by mouth daily   Omega-3 Fatty Acids (FISH OIL PO)   Yes No   Sig: Take 1 capsule by mouth daily   atorvastatin (LIPITOR) 40 mg tablet   Yes No   Sig: Take 40 mg by mouth daily   ciprofloxacin (CIPRO) 500 mg tablet   No No   Sig: Take 1 tablet (500 mg total) by mouth every 12 (twelve) hours for 10 days   metroNIDAZOLE (FLAGYL) 500 mg tablet   No No   Sig: Take 1 tablet (500 mg total) by mouth every 8 (eight) hours for 10 days      Facility-Administered Medications: None       History reviewed. No pertinent past medical history.    History reviewed. No pertinent surgical history.    History reviewed. No pertinent family history.  I have reviewed and agree with the history as documented.    E-Cigarette/Vaping    E-Cigarette Use Never User       E-Cigarette/Vaping Substances     Social History     Tobacco Use    Smoking status: Never    Smokeless tobacco: Never   Vaping Use    Vaping status: Never Used   Substance Use Topics    Alcohol use: Not Currently    Drug use: Never       Review of Systems   Constitutional:  Negative for chills and fever.   Respiratory:  Negative for cough and shortness of breath.    Cardiovascular:  Negative for chest pain.   Gastrointestinal:  Positive for abdominal pain and nausea. Negative for blood in stool, constipation, diarrhea and vomiting.   All other systems reviewed and are negative.      Physical Exam  Physical Exam  Vitals and nursing note reviewed.   Constitutional:       General: He is not in acute distress.     Appearance: Normal appearance.   HENT:      Head: Normocephalic and atraumatic.      Nose: Nose normal.   Eyes:      Conjunctiva/sclera: Conjunctivae normal.   Pulmonary:      Effort: Pulmonary effort is normal. No respiratory distress.   Abdominal:      Tenderness: There is abdominal tenderness in the right lower quadrant, suprapubic area and left lower quadrant. There is no guarding or rebound.   Skin:     General: Skin is dry.   Neurological:      General: No focal deficit present.      Mental Status: He is alert and oriented to person, place, and time.         Vital Signs  ED Triage Vitals [06/13/24 2205]   Temperature Pulse Respirations Blood Pressure SpO2   (!) 97.4 °F (36.3 °C) 77 17 120/76 99 %      Temp Source Heart Rate Source Patient Position - Orthostatic VS BP Location FiO2 (%)   Temporal Monitor Lying Left arm --      Pain Score       6           Vitals:    06/14/24 0000 06/14/24 0030 06/14/24 0100 06/14/24 0130   BP: 97/51 117/62 130/62 143/81   Pulse: 64 69 71 78   Patient Position - Orthostatic VS:             Visual Acuity      ED Medications  Medications   sodium chloride 0.9 % bolus 1,000 mL (0 mL Intravenous Stopped 6/13/24 2216)   ondansetron (ZOFRAN) injection 4 mg (4 mg Intravenous  Given 6/13/24 2239)   ondansetron (ZOFRAN) injection 4 mg (4 mg Intravenous Given 6/13/24 2256)   iohexol (OMNIPAQUE) 350 MG/ML injection (SINGLE-DOSE) 100 mL (100 mL Intravenous Given 6/13/24 2328)       Diagnostic Studies  Results Reviewed       Procedure Component Value Units Date/Time    CBC and differential [[id number]]  (Abnormal) Collected: 06/13/24 2238    Lab Status: Final result Specimen: Blood from Arm, Right Updated: 06/13/24 2320     WBC 10.69 Thousand/uL      RBC 5.04 Million/uL      Hemoglobin 14.7 g/dL      Hematocrit 44.2 %      MCV 88 fL      MCH 29.2 pg      MCHC 33.3 g/dL      RDW 12.8 %      MPV 9.7 fL      Platelets 302 Thousands/uL      nRBC 0 /100 WBCs      Segmented % 57 %      Immature Grans % 1 %      Lymphocytes % 29 %      Monocytes % 10 %      Eosinophils Relative 2 %      Basophils Relative 1 %      Absolute Neutrophils 6.27 Thousands/µL      Absolute Immature Grans 0.07 Thousand/uL      Absolute Lymphocytes 3.05 Thousands/µL      Absolute Monocytes 1.02 Thousand/µL      Eosinophils Absolute 0.23 Thousand/µL      Basophils Absolute 0.05 Thousands/µL     Urine Microscopic [[id number]]  (Abnormal) Collected: 06/13/24 2238    Lab Status: Final result Specimen: Urine, Clean Catch Updated: 06/13/24 2312     RBC, UA 0-1 /hpf      WBC, UA 2-4 /hpf      Epithelial Cells Occasional /hpf      Bacteria, UA Occasional /hpf      Hyaline Casts, UA 0-1 /lpf     Protime-INR [[id number]]  (Normal) Collected: 06/13/24 2238    Lab Status: Final result Specimen: Blood from Arm, Right Updated: 06/13/24 2309     Protime 13.6 seconds      INR 1.01    APTT [[id number]]  (Normal) Collected: 06/13/24 2238    Lab Status: Final result Specimen: Blood from Arm, Right Updated: 06/13/24 2309     PTT 27 seconds     Lactic acid, plasma (w/reflex if result > 2.0) [[id number]]  (Normal) Collected: 06/13/24 2238    Lab Status: Final result Specimen: Blood from Arm, Right Updated: 06/13/24 2308     LACTIC ACID 1.4 mmol/L      Narrative:      Result may be elevated if tourniquet was used during collection.    Comprehensive metabolic panel [[id number]]  (Abnormal) Collected: 06/13/24 2238    Lab Status: Final result Specimen: Blood from Arm, Right Updated: 06/13/24 2308     Sodium 138 mmol/L      Potassium 3.7 mmol/L      Chloride 100 mmol/L      CO2 29 mmol/L      ANION GAP 9 mmol/L      BUN 16 mg/dL      Creatinine 1.20 mg/dL      Glucose 99 mg/dL      Calcium 9.2 mg/dL      AST 53 U/L      ALT 66 U/L      Alkaline Phosphatase 50 U/L      Total Protein 8.2 g/dL      Albumin 4.2 g/dL      Total Bilirubin 0.96 mg/dL      eGFR 70 ml/min/1.73sq m     Narrative:      National Kidney Disease Foundation guidelines for Chronic Kidney Disease (CKD):     Stage 1 with normal or high GFR (GFR > 90 mL/min/1.73 square meters)    Stage 2 Mild CKD (GFR = 60-89 mL/min/1.73 square meters)    Stage 3A Moderate CKD (GFR = 45-59 mL/min/1.73 square meters)    Stage 3B Moderate CKD (GFR = 30-44 mL/min/1.73 square meters)    Stage 4 Severe CKD (GFR = 15-29 mL/min/1.73 square meters)    Stage 5 End Stage CKD (GFR <15 mL/min/1.73 square meters)  Note: GFR calculation is accurate only with a steady state creatinine    Lipase [[id number]]  (Normal) Collected: 06/13/24 2238    Lab Status: Final result Specimen: Blood from Arm, Right Updated: 06/13/24 2308     Lipase 15 u/L     UA w Reflex to Microscopic w Reflex to Culture [[id number]]  (Abnormal) Collected: 06/13/24 2238    Lab Status: Final result Specimen: Urine, Clean Catch Updated: 06/13/24 2252     Color, UA Yellow     Clarity, UA Clear     Specific Gravity, UA >=1.030     pH, UA 5.5     Leukocytes, UA Trace     Nitrite, UA Negative     Protein, UA Negative mg/dl      Glucose, UA Negative mg/dl      Ketones, UA Trace mg/dl      Urobilinogen, UA 0.2 E.U./dl      Bilirubin, UA Small     Occult Blood, UA Moderate                   CT abdomen pelvis with contrast   Final Result by Bette Miller MD (06/14 0104)       Redemonstration of acute sigmoid diverticulitis complicated by microperforation. The extent of perisigmoid inflammation and extraluminal air has improved. No discrete rim-enhancing fluid collection to suggest an abscess.      Extensive sclerotic changes on both sides of bilateral sacroiliac joints compatible with sacroiliitis. Consider follow-up with rheumatology for further work-up. MRI may be considered as clinically appropriate.         Workstation performed: [id number]                    Procedures  Procedures         ED Course  ED Course as of 06/14/24 0211   Fri Jun 14, 2024   0148 Spoke with Dr. Mixon general surgery on-call reviewed case and findings in the emergency department advised to continue on oral antibiotics for now may be discharged as diverticulitis and microperforation is improving without development of abscess advise follow-up outpatient in general surgery office within the next week.  Advised low fiber low residue diet.                                 SBIRT 22yo+      Flowsheet Row Most Recent Value   Initial Alcohol Screen: US AUDIT-C     1. How often do you have a drink containing alcohol? 0 Filed at: 06/13/2024 2205   2. How many drinks containing alcohol do you have on a typical day you are drinking?  0 Filed at: 06/13/2024 2205   3a. Male UNDER 65: How often do you have five or more drinks on one occasion? 0 Filed at: 06/13/2024 2205   3b. FEMALE Any Age, or MALE 65+: How often do you have 4 or more drinks on one occassion? 0 Filed at: 06/13/2024 2205   Audit-C Score 0 Filed at: 06/13/2024 2205   GAYLE: How many times in the past year have you...    Used an illegal drug or used a prescription medication for non-medical reasons? Never Filed at: 06/13/2024 2205                      Medical Decision Making  Differential diagnosis included but not limited to diverticulitis abscess perforation colitis bowel obstruction.  Patient remained clinically and hemodynamically stable in the  emergency department he is afebrile nontoxic well-appearing wanted nothing for pain in the ED and pain improved with fluids and antiemetics given.  Workup in the ED reveals improving sigmoid diverticulitis with microperforation case was discussed and reviewed with general surgery on-call for now recommending oral antibiotics to continue outpatient and follow-up with general surgery office as an outpatient patient and family understand instructions and agree and will follow-up as advised.  return precautions and anticipatory guidance discussed.      Problems Addressed:  Diverticulitis: acute illness or injury    Amount and/or Complexity of Data Reviewed  Labs: ordered. Decision-making details documented in ED Course.  Radiology: ordered. Decision-making details documented in ED Course.    Risk  Prescription drug management.             Disposition  Final diagnoses:   Diverticulitis - With microperforation without abscess improving     Time reflects when diagnosis was documented in both MDM as applicable and the Disposition within this note       Time User Action Codes Description Comment    6/14/2024  1:46 AM Remberto Oropeza Add [K57.92] Diverticulitis     6/14/2024  1:46 AM Remberto Oropeza Modify [K57.92] Diverticulitis With microperforation without abscess improving          ED Disposition       ED Disposition   Discharge    Condition   Stable    Date/Time   Fri Jun 14, 2024 0146    Comment   Thomassandrine Marcus discharge to home/self care.                   Follow-up Information       Follow up With Specialties Details Why Contact Info    Alexia Cyr PA-C Physician Assistant Schedule an appointment as soon as possible for a visit in 3 days  [address] [facility] [address]  [phone]      Nani Woo DO General Surgery Schedule an appointment as soon as possible for a visit in 3 days  [address] [address]  [phone]              Discharge Medication List as  of 6/14/2024  1:47 AM        CONTINUE these medications which have NOT CHANGED    Details   atorvastatin (LIPITOR) 40 mg tablet Take 40 mg by mouth daily, Historical Med      Cholecalciferol (VITAMIN D3) 1,000 units tablet Take 1,000 Units by mouth daily, Historical Med      ciprofloxacin (CIPRO) 500 mg tablet Take 1 tablet (500 mg total) by mouth every 12 (twelve) hours for 10 days, Starting Mon 6/10/2024, Until u 6/20/2024, Normal      metroNIDAZOLE (FLAGYL) 500 mg tablet Take 1 tablet (500 mg total) by mouth every 8 (eight) hours for 10 days, Starting Mon 6/10/2024, Until u 6/20/2024, Normal      Omega-3 Fatty Acids (FISH OIL PO) Take 1 capsule by mouth daily, Historical Med             No discharge procedures on file.    PDMP Review       None            ED Provider  Electronically Signed by             Rmeberto Oropeza DO  06/14/24 5354

## 2024-06-17 DIAGNOSIS — K57.80 DIVERTICULITIS OF INTESTINE, PART UNSPECIFIED, WITH PERFORATION AND ABSCESS WITHOUT BLEEDING: ICD-10-CM

## 2024-06-21 ENCOUNTER — HOSPITAL ENCOUNTER (INPATIENT)
Facility: HOSPITAL | Age: 49
LOS: 3 days | Discharge: HOME | DRG: 392 | End: 2024-06-24
Attending: EMERGENCY MEDICINE | Admitting: STUDENT IN AN ORGANIZED HEALTH CARE EDUCATION/TRAINING PROGRAM
Payer: COMMERCIAL

## 2024-06-21 ENCOUNTER — APPOINTMENT (EMERGENCY)
Dept: CT IMAGING | Facility: HOSPITAL | Age: 49
DRG: 392 | End: 2024-06-21
Payer: COMMERCIAL

## 2024-06-21 DIAGNOSIS — E66.01 MORBID OBESITY (HCC): ICD-10-CM

## 2024-06-21 DIAGNOSIS — K57.92 DIVERTICULITIS: Primary | ICD-10-CM

## 2024-06-21 DIAGNOSIS — K57.20 DIVERTICULITIS OF LARGE INTESTINE WITH PERFORATION WITHOUT BLEEDING: ICD-10-CM

## 2024-06-21 LAB
ALBUMIN SERPL BCG-MCNC: 4 G/DL (ref 3.5–5)
ALP SERPL-CCNC: 46 U/L (ref 34–104)
ALT SERPL W P-5'-P-CCNC: 25 U/L (ref 7–52)
ANION GAP SERPL CALCULATED.3IONS-SCNC: 5 MMOL/L (ref 4–13)
AST SERPL W P-5'-P-CCNC: 17 U/L (ref 13–39)
BACTERIA UR QL AUTO: NORMAL /HPF
BASOPHILS # BLD AUTO: 0.05 THOUSANDS/ÂΜL (ref 0–0.1)
BASOPHILS NFR BLD AUTO: 0 % (ref 0–1)
BILIRUB SERPL-MCNC: 1.1 MG/DL (ref 0.2–1)
BILIRUB UR QL STRIP: NEGATIVE
BUN SERPL-MCNC: 11 MG/DL (ref 5–25)
CALCIUM SERPL-MCNC: 9.2 MG/DL (ref 8.4–10.2)
CHLORIDE SERPL-SCNC: 101 MMOL/L (ref 96–108)
CLARITY UR: CLEAR
CO2 SERPL-SCNC: 30 MMOL/L (ref 21–32)
COLOR UR: YELLOW
CREAT SERPL-MCNC: 1 MG/DL (ref 0.6–1.3)
EOSINOPHIL # BLD AUTO: 0.22 THOUSAND/ÂΜL (ref 0–0.61)
EOSINOPHIL NFR BLD AUTO: 2 % (ref 0–6)
ERYTHROCYTE [DISTWIDTH] IN BLOOD BY AUTOMATED COUNT: 12.9 % (ref 11.6–15.1)
GFR SERPL CREATININE-BSD FRML MDRD: 87 ML/MIN/1.73SQ M
GLUCOSE SERPL-MCNC: 122 MG/DL (ref 65–140)
GLUCOSE UR STRIP-MCNC: NEGATIVE MG/DL
HCT VFR BLD AUTO: 42.1 % (ref 36.5–49.3)
HGB BLD-MCNC: 13.9 G/DL (ref 12–17)
HGB UR QL STRIP.AUTO: ABNORMAL
IMM GRANULOCYTES # BLD AUTO: 0.05 THOUSAND/UL (ref 0–0.2)
IMM GRANULOCYTES NFR BLD AUTO: 0 % (ref 0–2)
KETONES UR STRIP-MCNC: NEGATIVE MG/DL
LACTATE SERPL-SCNC: 0.8 MMOL/L (ref 0.5–2)
LEUKOCYTE ESTERASE UR QL STRIP: NEGATIVE
LIPASE SERPL-CCNC: 64 U/L (ref 11–82)
LYMPHOCYTES # BLD AUTO: 2.12 THOUSANDS/ÂΜL (ref 0.6–4.47)
LYMPHOCYTES NFR BLD AUTO: 16 % (ref 14–44)
MCH RBC QN AUTO: 28.7 PG (ref 26.8–34.3)
MCHC RBC AUTO-ENTMCNC: 33 G/DL (ref 31.4–37.4)
MCV RBC AUTO: 87 FL (ref 82–98)
MONOCYTES # BLD AUTO: 1.27 THOUSAND/ÂΜL (ref 0.17–1.22)
MONOCYTES NFR BLD AUTO: 10 % (ref 4–12)
NEUTROPHILS # BLD AUTO: 9.22 THOUSANDS/ÂΜL (ref 1.85–7.62)
NEUTS SEG NFR BLD AUTO: 72 % (ref 43–75)
NITRITE UR QL STRIP: NEGATIVE
NON-SQ EPI CELLS URNS QL MICRO: NORMAL /HPF
NRBC BLD AUTO-RTO: 0 /100 WBCS
PH UR STRIP.AUTO: 6.5 [PH]
PLATELET # BLD AUTO: 306 THOUSANDS/UL (ref 149–390)
PMV BLD AUTO: 9.7 FL (ref 8.9–12.7)
POTASSIUM SERPL-SCNC: 3.8 MMOL/L (ref 3.5–5.3)
PROT SERPL-MCNC: 7.4 G/DL (ref 6.4–8.4)
PROT UR STRIP-MCNC: NEGATIVE MG/DL
RBC # BLD AUTO: 4.84 MILLION/UL (ref 3.88–5.62)
RBC #/AREA URNS AUTO: NORMAL /HPF
SODIUM SERPL-SCNC: 136 MMOL/L (ref 135–147)
SP GR UR STRIP.AUTO: <=1.005 (ref 1–1.03)
UROBILINOGEN UR QL STRIP.AUTO: 0.2 E.U./DL
WBC # BLD AUTO: 12.93 THOUSAND/UL (ref 4.31–10.16)
WBC #/AREA URNS AUTO: NORMAL /HPF

## 2024-06-21 PROCEDURE — 36415 COLL VENOUS BLD VENIPUNCTURE: CPT | Performed by: EMERGENCY MEDICINE

## 2024-06-21 PROCEDURE — 99285 EMERGENCY DEPT VISIT HI MDM: CPT | Performed by: PHYSICIAN ASSISTANT

## 2024-06-21 PROCEDURE — 83605 ASSAY OF LACTIC ACID: CPT | Performed by: PHYSICIAN ASSISTANT

## 2024-06-21 PROCEDURE — 85025 COMPLETE CBC W/AUTO DIFF WBC: CPT | Performed by: EMERGENCY MEDICINE

## 2024-06-21 PROCEDURE — 87040 BLOOD CULTURE FOR BACTERIA: CPT | Performed by: PHYSICIAN ASSISTANT

## 2024-06-21 PROCEDURE — 81001 URINALYSIS AUTO W/SCOPE: CPT | Performed by: PHYSICIAN ASSISTANT

## 2024-06-21 PROCEDURE — 83690 ASSAY OF LIPASE: CPT | Performed by: EMERGENCY MEDICINE

## 2024-06-21 PROCEDURE — 96365 THER/PROPH/DIAG IV INF INIT: CPT

## 2024-06-21 PROCEDURE — 80053 COMPREHEN METABOLIC PANEL: CPT | Performed by: EMERGENCY MEDICINE

## 2024-06-21 PROCEDURE — 96361 HYDRATE IV INFUSION ADD-ON: CPT

## 2024-06-21 PROCEDURE — 74177 CT ABD & PELVIS W/CONTRAST: CPT

## 2024-06-21 PROCEDURE — 99284 EMERGENCY DEPT VISIT MOD MDM: CPT

## 2024-06-21 PROCEDURE — 96375 TX/PRO/DX INJ NEW DRUG ADDON: CPT

## 2024-06-21 RX ORDER — HEPARIN SODIUM 5000 [USP'U]/ML
5000 INJECTION, SOLUTION INTRAVENOUS; SUBCUTANEOUS EVERY 8 HOURS SCHEDULED
Status: DISCONTINUED | OUTPATIENT
Start: 2024-06-21 | End: 2024-06-24 | Stop reason: HOSPADM

## 2024-06-21 RX ORDER — KETOROLAC TROMETHAMINE 30 MG/ML
15 INJECTION, SOLUTION INTRAMUSCULAR; INTRAVENOUS ONCE
Status: DISCONTINUED | OUTPATIENT
Start: 2024-06-21 | End: 2024-06-21

## 2024-06-21 RX ORDER — IBUPROFEN 600 MG/1
600 TABLET ORAL EVERY 6 HOURS PRN
Status: DISCONTINUED | OUTPATIENT
Start: 2024-06-21 | End: 2024-06-24 | Stop reason: HOSPADM

## 2024-06-21 RX ORDER — MORPHINE SULFATE 4 MG/ML
4 INJECTION, SOLUTION INTRAMUSCULAR; INTRAVENOUS ONCE
Status: DISCONTINUED | OUTPATIENT
Start: 2024-06-21 | End: 2024-06-21

## 2024-06-21 RX ORDER — ATORVASTATIN CALCIUM 40 MG/1
40 TABLET, FILM COATED ORAL DAILY
Status: DISCONTINUED | OUTPATIENT
Start: 2024-06-22 | End: 2024-06-24 | Stop reason: HOSPADM

## 2024-06-21 RX ORDER — SODIUM CHLORIDE, SODIUM LACTATE, POTASSIUM CHLORIDE, CALCIUM CHLORIDE 600; 310; 30; 20 MG/100ML; MG/100ML; MG/100ML; MG/100ML
50 INJECTION, SOLUTION INTRAVENOUS CONTINUOUS
Status: DISCONTINUED | OUTPATIENT
Start: 2024-06-21 | End: 2024-06-23

## 2024-06-21 RX ORDER — CEFTRIAXONE 1 G/50ML
1000 INJECTION, SOLUTION INTRAVENOUS EVERY 24 HOURS
Status: DISCONTINUED | OUTPATIENT
Start: 2024-06-21 | End: 2024-06-24

## 2024-06-21 RX ORDER — ACETAMINOPHEN 325 MG/1
650 TABLET ORAL EVERY 6 HOURS PRN
Status: DISCONTINUED | OUTPATIENT
Start: 2024-06-21 | End: 2024-06-24 | Stop reason: HOSPADM

## 2024-06-21 RX ORDER — ONDANSETRON 2 MG/ML
4 INJECTION INTRAMUSCULAR; INTRAVENOUS EVERY 6 HOURS PRN
Status: DISCONTINUED | OUTPATIENT
Start: 2024-06-21 | End: 2024-06-24 | Stop reason: HOSPADM

## 2024-06-21 RX ORDER — MORPHINE SULFATE 4 MG/ML
4 INJECTION, SOLUTION INTRAMUSCULAR; INTRAVENOUS ONCE
Status: COMPLETED | OUTPATIENT
Start: 2024-06-21 | End: 2024-06-21

## 2024-06-21 RX ORDER — MORPHINE SULFATE 4 MG/ML
4 INJECTION, SOLUTION INTRAMUSCULAR; INTRAVENOUS
Status: DISCONTINUED | OUTPATIENT
Start: 2024-06-21 | End: 2024-06-24 | Stop reason: HOSPADM

## 2024-06-21 RX ADMIN — SODIUM CHLORIDE 1000 ML: 0.9 INJECTION, SOLUTION INTRAVENOUS at 16:40

## 2024-06-21 RX ADMIN — SODIUM CHLORIDE, SODIUM LACTATE, POTASSIUM CHLORIDE, AND CALCIUM CHLORIDE 125 ML/HR: .6; .31; .03; .02 INJECTION, SOLUTION INTRAVENOUS at 21:15

## 2024-06-21 RX ADMIN — IOHEXOL 100 ML: 350 INJECTION, SOLUTION INTRAVENOUS at 15:12

## 2024-06-21 RX ADMIN — SODIUM CHLORIDE 1000 ML: 0.9 INJECTION, SOLUTION INTRAVENOUS at 14:16

## 2024-06-21 RX ADMIN — CEFTRIAXONE 1000 MG: 1 INJECTION, SOLUTION INTRAVENOUS at 21:04

## 2024-06-21 RX ADMIN — MORPHINE SULFATE 4 MG: 4 INJECTION INTRAVENOUS at 14:16

## 2024-06-21 RX ADMIN — PIPERACILLIN AND TAZOBACTAM 4.5 G: 4; .5 INJECTION, POWDER, FOR SOLUTION INTRAVENOUS at 16:40

## 2024-06-21 RX ADMIN — IBUPROFEN 600 MG: 600 TABLET ORAL at 21:55

## 2024-06-21 NOTE — H&P
H&P Exam - General Surgery   Michael Velazquez 49 y.o. male MRN: [id number]  Unit/Bed#: ED 10 Encounter: [id number]    Assessment & Plan     Assessment: 48yo M with persistent perforated sigmoid diverticulitis with possible early mural abscess  -Patient was admitted here 6/7-6/10 with acute sigmoid diverticulitis with microperforation, treated conservatively.  No history of diverticulitis prior to this episode. Screening colonoscopy last year with no findings per patient.     CTAP 6/21/24: IMP: Worsening inflammatory changes of perforated sigmoid diverticulitis with possible early mural abscess. No drainable fluid collection.     WBC 12.9  CMP WNL  VSS and WNL  PMHx: Hyperlipidemia  PSHx: None    Plan:  Admission to the surgical service   No acute surgical intervention necessary  IV antibiotics  IV fluids  Clear liquid diet  Analgesics/antiemetics as needed  Serial abdominal exam  Repeat labs    HPI:  Michael Velazquez is a 49 y.o. male who admitted to [facility] from 6/7/2024 to 6/10/2024 with newly diagnosed acute sigmoid diverticulitis with microperforation, treated conservatively.  He was discharged home on Cipro and Flagyl.  He continued to have persistent mild lower abdominal pain and tenderness which prompted him to return to the ED on 6/13/2024 at which time CT showed improvement in the amount of inflammation and air present.  He presents today due to a 1 week history of worsening intermittent episodes of feeling that he has to have a bowel movement but is unable to.  He states that he is passing small amounts of stool daily, but continues to have the sensation that he needs to go.  His abdominal pain is persistent but no worse.  It is mild, located in the mid lower abdomen and aggravated with palpation.  It is worse with a full bladder.  He denies any fever, chills, hematochezia, melena, dysuria, hematuria, bloating, nausea, vomiting.  His oral Cipro Flagyl is due to finish today.  He has been trying to follow a low  fiber diet.  He denies needing any pain medication or laxatives.  While in the ED today, lab studies found a mild leukocytosis of WBC 12.9 but were otherwise normal.  Repeat CTAP found sigmoid diverticulitis with interval worsening of pericolonic fat stranding and adjacent phlegmon and possible early mural abscess development.  Physical exam shows mild tenderness with palpation in the lower abdomen but no peritoneal signs.    Review of Systems   Constitutional:  Negative for chills and fever.   HENT:  Negative for ear pain and sore throat.    Eyes:  Negative for pain and visual disturbance.   Respiratory:  Negative for cough and shortness of breath.    Cardiovascular:  Negative for chest pain and palpitations.   Gastrointestinal:  Negative for abdominal distention, abdominal pain, blood in stool, diarrhea, nausea, rectal pain and vomiting.   Genitourinary:  Negative for difficulty urinating, dysuria and hematuria.   Musculoskeletal:  Negative for arthralgias and back pain.   Skin:  Negative for color change and rash.   Neurological:  Negative for dizziness, seizures, syncope and weakness.   All other systems reviewed and are negative.      Historical Information   Past Medical History:   Diagnosis Date    Diverticulitis      No past surgical history on file.  Social History   Social History     Substance and Sexual Activity   Alcohol Use Not Currently     Social History     Substance and Sexual Activity   Drug Use Never     Social History     Tobacco Use   Smoking Status Never   Smokeless Tobacco Never     E-Cigarette/Vaping    E-Cigarette Use Never User      E-Cigarette/Vaping Substances     Family History: non-contributory    Meds/Allergies   PTA meds:   Prior to Admission Medications   Prescriptions Last Dose Informant Patient Reported? Taking?   Cholecalciferol (VITAMIN D3) 1,000 units tablet 6/20/2024  Yes Yes   Sig: Take 1,000 Units by mouth daily   Omega-3 Fatty Acids (FISH OIL PO) 6/20/2024  Yes Yes   Sig:  "Take 1 capsule by mouth daily   atorvastatin (LIPITOR) 40 mg tablet 6/20/2024  Yes Yes   Sig: Take 40 mg by mouth daily   ciprofloxacin (CIPRO) 500 mg tablet 6/21/2024  No Yes   Sig: Take 1 tablet (500 mg total) by mouth every 12 (twelve) hours for 10 days   metroNIDAZOLE (FLAGYL) 500 mg tablet 6/21/2024  No Yes   Sig: Take 1 tablet (500 mg total) by mouth every 8 (eight) hours for 10 days      Facility-Administered Medications: None     No Known Allergies    Objective   First Vitals:   Blood Pressure: 145/82 (06/21/24 1335)  Pulse: 94 (06/21/24 1335)  Temperature: 98.3 °F (36.8 °C) (06/21/24 1335)  Temp Source: Tympanic (06/21/24 1335)  Respirations: 16 (06/21/24 1335)  Height: 5' 3\" (160 cm) (06/21/24 1335)  Weight - Scale: 113 kg (248 lb 0.3 oz) (06/21/24 1335)  SpO2: 96 % (06/21/24 1335)    Current Vitals:   Blood Pressure: 161/82 (06/21/24 1630)  Pulse: 82 (06/21/24 1630)  Temperature: 98.3 °F (36.8 °C) (06/21/24 1335)  Temp Source: Tympanic (06/21/24 1335)  Respirations: 17 (06/21/24 1630)  Height: 5' 3\" (160 cm) (06/21/24 1335)  Weight - Scale: 113 kg (248 lb 0.3 oz) (06/21/24 1335)  SpO2: 99 % (06/21/24 1630)    No intake or output data in the 24 hours ending 06/21/24 1705    Invasive Devices       Peripheral Intravenous Line  Duration             Peripheral IV 06/21/24 Right Antecubital <1 day                    Physical Exam  Vitals and nursing note reviewed.   Constitutional:       General: He is not in acute distress.     Appearance: Normal appearance. He is well-developed. He is obese.   HENT:      Head: Normocephalic and atraumatic.   Eyes:      Conjunctiva/sclera: Conjunctivae normal.   Cardiovascular:      Rate and Rhythm: Normal rate and regular rhythm.   Pulmonary:      Effort: Pulmonary effort is normal. No respiratory distress.      Breath sounds: Normal breath sounds.   Abdominal:      General: There is no distension.      Palpations: Abdomen is soft. There is no mass.      Tenderness: There is " "no abdominal tenderness. There is no guarding or rebound.      Hernia: No hernia is present.      Comments: Tenderness to the deep palpation in the mid lower abdomen, otherwise abdomen nontender.    Musculoskeletal:         General: No swelling.      Cervical back: Neck supple.   Skin:     General: Skin is warm and dry.      Capillary Refill: Capillary refill takes less than 2 seconds.   Neurological:      General: No focal deficit present.      Mental Status: He is alert and oriented to person, place, and time.   Psychiatric:         Mood and Affect: Mood normal.         Behavior: Behavior normal.         Lab Results: CBC:   Lab Results   Component Value Date    WBC 12.93 (H) 06/21/2024    HGB 13.9 06/21/2024    HCT 42.1 06/21/2024    MCV 87 06/21/2024     06/21/2024    RBC 4.84 06/21/2024    MCH 28.7 06/21/2024    MCHC 33.0 06/21/2024    RDW 12.9 06/21/2024    MPV 9.7 06/21/2024    NRBC 0 06/21/2024   , CMP:   Lab Results   Component Value Date    SODIUM 136 06/21/2024    K 3.8 06/21/2024     06/21/2024    CO2 30 06/21/2024    BUN 11 06/21/2024    CREATININE 1.00 06/21/2024    CALCIUM 9.2 06/21/2024    AST 17 06/21/2024    ALT 25 06/21/2024    ALKPHOS 46 06/21/2024    EGFR 87 06/21/2024   , Coagulation: No results found for: \"PT\", \"INR\", \"APTT\", Urinalysis:   Lab Results   Component Value Date    COLORU Yellow 06/21/2024    CLARITYU Clear 06/21/2024    SPECGRAV <=1.005 06/21/2024    PHUR 6.5 06/21/2024    LEUKOCYTESUR Negative 06/21/2024    NITRITE Negative 06/21/2024    GLUCOSEU Negative 06/21/2024    KETONESU Negative 06/21/2024    BILIRUBINUR Negative 06/21/2024    BLOODU Small (A) 06/21/2024   , Amylase: No results found for: \"AMYLASE\", Lipase:   Lab Results   Component Value Date    LIPASE 64 06/21/2024     Imaging: I have personally reviewed pertinent reports.    EKG, Pathology, and Other Studies: I have personally reviewed pertinent reports.      Code Status: Prior  Advance Directive and Living " Will:      Power of :    POLST:      Counseling / Coordination of Care  Total floor / unit time spent today 45 minutes.  Greater than 50% of total time was spent with the patient and / or family counseling and / or coordination of care.  A description of the counseling / coordination of care:  Review of patient's  Pmhx history, labs, imaging.  Obtaining HPI, performing exam, discussing treatment plan with patient.   .

## 2024-06-21 NOTE — ED PROVIDER NOTES
History  Chief Complaint   Patient presents with    Abdominal Pain     Pt started with abd pain 2 week ago, admitted for sepsis and diverticulitis. Pt having loose stool. Increased urination      Patient is a 49-year-old male with a past medical history of diverticulitis who presents emergency department today with complaint of left lower quadrant abdominal pain that began earlier this morning.  States that he feels as though he is unable to have a full bowel movement.  Feels as though he has had worsening infrequent urination.  Patient denies any dysuria.          Prior to Admission Medications   Prescriptions Last Dose Informant Patient Reported? Taking?   Cholecalciferol (VITAMIN D3) 1,000 units tablet 6/20/2024  Yes Yes   Sig: Take 1,000 Units by mouth daily   Omega-3 Fatty Acids (FISH OIL PO) 6/20/2024  Yes Yes   Sig: Take 1 capsule by mouth daily   atorvastatin (LIPITOR) 40 mg tablet 6/20/2024  Yes Yes   Sig: Take 40 mg by mouth daily   ciprofloxacin (CIPRO) 500 mg tablet 6/21/2024  No Yes   Sig: Take 1 tablet (500 mg total) by mouth every 12 (twelve) hours for 10 days   metroNIDAZOLE (FLAGYL) 500 mg tablet 6/21/2024  No Yes   Sig: Take 1 tablet (500 mg total) by mouth every 8 (eight) hours for 10 days      Facility-Administered Medications: None       Past Medical History:   Diagnosis Date    Diverticulitis        No past surgical history on file.    No family history on file.  I have reviewed and agree with the history as documented.    E-Cigarette/Vaping    E-Cigarette Use Never User      E-Cigarette/Vaping Substances     Social History     Tobacco Use    Smoking status: Never    Smokeless tobacco: Never   Vaping Use    Vaping status: Never Used   Substance Use Topics    Alcohol use: Not Currently    Drug use: Never       Review of Systems   All other systems reviewed and are negative.      Physical Exam  Physical Exam  Vitals and nursing note reviewed.   Constitutional:       General: He is in acute  distress.      Appearance: He is well-developed.   HENT:      Head: Normocephalic and atraumatic.      Mouth/Throat:      Mouth: Oropharynx is clear and moist.   Eyes:      Extraocular Movements: Extraocular movements intact and EOM normal.      Pupils: Pupils are equal, round, and reactive to light.   Cardiovascular:      Rate and Rhythm: Normal rate and regular rhythm.      Heart sounds: No murmur heard.  Pulmonary:      Effort: Pulmonary effort is normal. No respiratory distress.      Breath sounds: Normal breath sounds.   Abdominal:      General: Bowel sounds are normal.      Palpations: Abdomen is soft.      Tenderness: There is abdominal tenderness in the left lower quadrant.   Musculoskeletal:      Cervical back: Normal range of motion.   Skin:     General: Skin is warm and dry.      Capillary Refill: Capillary refill takes less than 2 seconds.   Neurological:      Mental Status: He is alert and oriented to person, place, and time.   Psychiatric:         Mood and Affect: Mood and affect normal.         Behavior: Behavior normal.         Vital Signs  ED Triage Vitals   Temperature Pulse Respirations Blood Pressure SpO2   06/21/24 1335 06/21/24 1335 06/21/24 1335 06/21/24 1335 06/21/24 1335   98.3 °F (36.8 °C) 94 16 145/82 96 %      Temp Source Heart Rate Source Patient Position - Orthostatic VS BP Location FiO2 (%)   06/21/24 1335 06/21/24 1335 06/21/24 1445 06/21/24 1445 --   Tympanic Monitor Lying Right arm       Pain Score       06/21/24 1335       10 - Worst Possible Pain           Vitals:    06/21/24 1335 06/21/24 1445 06/21/24 1515 06/21/24 1630   BP: 145/82 130/76 163/74 161/82   Pulse: 94 80 95 82   Patient Position - Orthostatic VS:  Lying Lying Lying         Visual Acuity      ED Medications  Medications   morphine injection 4 mg (4 mg Intravenous Not Given 6/21/24 1640)   sodium chloride 0.9 % bolus 1,000 mL (1,000 mL Intravenous New Bag 6/21/24 1640)   sodium chloride 0.9 % bolus 1,000 mL (1,000 mL  Intravenous New Bag 6/21/24 1416)   morphine injection 4 mg (4 mg Intravenous Given 6/21/24 1416)   iohexol (OMNIPAQUE) 350 MG/ML injection (MULTI-DOSE) 100 mL (100 mL Intravenous Given 6/21/24 1512)   piperacillin-tazobactam (ZOSYN) 4.5 g in sodium chloride 0.9 % 100 mL IVPB (4.5 g Intravenous New Bag 6/21/24 1640)       Diagnostic Studies  Results Reviewed       Procedure Component Value Units Date/Time    Urine Microscopic [[id number]]  (Normal) Collected: 06/21/24 1401    Lab Status: Final result Specimen: Urine, Clean Catch Updated: 06/21/24 1429     RBC, UA 2-4 /hpf      WBC, UA None Seen /hpf      Epithelial Cells Occasional /hpf      Bacteria, UA None Seen /hpf     UA w Reflex to Microscopic w Reflex to Culture [[id number]]  (Abnormal) Collected: 06/21/24 1401    Lab Status: Final result Specimen: Urine, Clean Catch Updated: 06/21/24 1420     Color, UA Yellow     Clarity, UA Clear     Specific Gravity, UA <=1.005     pH, UA 6.5     Leukocytes, UA Negative     Nitrite, UA Negative     Protein, UA Negative mg/dl      Glucose, UA Negative mg/dl      Ketones, UA Negative mg/dl      Urobilinogen, UA 0.2 E.U./dl      Bilirubin, UA Negative     Occult Blood, UA Small    Lactic acid, plasma (w/reflex if result > 2.0) [[id number]]  (Normal) Collected: 06/21/24 1355    Lab Status: Final result Specimen: Blood from Line, Venous Updated: 06/21/24 1418     LACTIC ACID 0.8 mmol/L     Narrative:      Result may be elevated if tourniquet was used during collection.    Blood culture #2 [[id number]] Collected: 06/21/24 1411    Lab Status: In process Specimen: Blood from Arm, Right Updated: 06/21/24 1415    Blood culture #1 [[id number]] Collected: 06/21/24 1406    Lab Status: In process Specimen: Blood from Arm, Left Updated: 06/21/24 1415    Comprehensive metabolic panel [[id number]]  (Abnormal) Collected: 06/21/24 1346    Lab Status: Final result Specimen: Blood from Line, Venous Updated: 06/21/24 1410     Sodium 136 mmol/L       Potassium 3.8 mmol/L      Chloride 101 mmol/L      CO2 30 mmol/L      ANION GAP 5 mmol/L      BUN 11 mg/dL      Creatinine 1.00 mg/dL      Glucose 122 mg/dL      Calcium 9.2 mg/dL      AST 17 U/L      ALT 25 U/L      Alkaline Phosphatase 46 U/L      Total Protein 7.4 g/dL      Albumin 4.0 g/dL      Total Bilirubin 1.10 mg/dL      eGFR 87 ml/min/1.73sq m     Narrative:      National Kid[employer] Disease Foundation guidelines for Chronic Kidney Disease (CKD):     Stage 1 with normal or high GFR (GFR > 90 mL/min/1.73 square meters)    Stage 2 Mild CKD (GFR = 60-89 mL/min/1.73 square meters)    Stage 3A Moderate CKD (GFR = 45-59 mL/min/1.73 square meters)    Stage 3B Moderate CKD (GFR = 30-44 mL/min/1.73 square meters)    Stage 4 Severe CKD (GFR = 15-29 mL/min/1.73 square meters)    Stage 5 End Stage CKD (GFR <15 mL/min/1.73 square meters)  Note: GFR calculation is accurate only with a steady state creatinine    Lipase [[id number]]  (Normal) Collected: 06/21/24 1346    Lab Status: Final result Specimen: Blood from Line, Venous Updated: 06/21/24 1410     Lipase 64 u/L     CBC and differential [[id number]]  (Abnormal) Collected: 06/21/24 1346    Lab Status: Final result Specimen: Blood from Line, Venous Updated: 06/21/24 1354     WBC 12.93 Thousand/uL      RBC 4.84 Million/uL      Hemoglobin 13.9 g/dL      Hematocrit 42.1 %      MCV 87 fL      MCH 28.7 pg      MCHC 33.0 g/dL      RDW 12.9 %      MPV 9.7 fL      Platelets 306 Thousands/uL      nRBC 0 /100 WBCs      Segmented % 72 %      Immature Grans % 0 %      Lymphocytes % 16 %      Monocytes % 10 %      Eosinophils Relative 2 %      Basophils Relative 0 %      Absolute Neutrophils 9.22 Thousands/µL      Absolute Immature Grans 0.05 Thousand/uL      Absolute Lymphocytes 2.12 Thousands/µL      Absolute Monocytes 1.27 Thousand/µL      Eosinophils Absolute 0.22 Thousand/µL      Basophils Absolute 0.05 Thousands/µL                    CT abdomen pelvis with contrast   Final Result  by Aden Alexander MD (06/21 1611)      Worsening inflammatory changes of perforated sigmoid diverticulitis with possible early mural abscess. No drainable fluid collection.      The study was marked in EPIC for immediate notification.      Resident: LYNDSEY CHENG I, the attending radiologist, have reviewed the images and agree with the final report above.      Workstation performed: [id number]                    Procedures  Procedures         ED Course  ED Course as of 06/21/24 1738   Fri Jun 21, 2024   1628 Reaching out to general surgeon Dr. Woo due to worsening diverticulitis with abscess.                                 SBIRT 22yo+      Flowsheet Row Most Recent Value   Initial Alcohol Screen: US AUDIT-C     1. How often do you have a drink containing alcohol? 0 Filed at: 06/21/2024 1337   2. How many drinks containing alcohol do you have on a typical day you are drinking?  0 Filed at: 06/21/2024 1337   3a. Male UNDER 65: How often do you have five or more drinks on one occasion? 0 Filed at: 06/21/2024 1337   3b. FEMALE Any Age, or MALE 65+: How often do you have 4 or more drinks on one occassion? 0 Filed at: 06/21/2024 1337   Audit-C Score 0 Filed at: 06/21/2024 1337   GAYLE: How many times in the past year have you...    Used an illegal drug or used a prescription medication for non-medical reasons? Never Filed at: 06/21/2024 1337                      Medical Decision Making  Patient is a 49-year-old male with a past medical history of diverticulitis who presents emergency department today with complaint of left lower quadrant abdominal pain that began earlier this morning.  States that he feels as though he is unable to have a full bowel movement.  Feels as though he has had worsening infrequent urination.  Patient denies any dysuria.    Patient has left lower quadrant abdominal pain.  Leukocytosis on lab work discussed with general surgery due to the abnormality on CAT scan noting  diverticulitis with possible abscess formation.  Did admit to general surgery service.  Patient in agreement treatment plan placed on Zosyn for broad-spectrum coverage.  Patient's pain was controlled in the ER.      Differential diagnosis was included but not limited to: GERD, gastritis, PUD, esophageal spasm, pancreatitis, acute cholecystitis, acute cholangitis, biliary colic, acute cystitis, renal colic, kidney stone, MSK pain, urinary retention, colitis, diverticulitis, constipation, proctitis, small bowel obstruction, large bowel obstruction, mass, viral syndrome      Amount and/or Complexity of Data Reviewed  Labs: ordered. Decision-making details documented in ED Course.  Radiology: ordered and independent interpretation performed. Decision-making details documented in ED Course.  Discussion of management or test interpretation with external provider(s): Dr. Woo    Risk  Prescription drug management.  Decision regarding hospitalization.             Disposition  Final diagnoses:   Diverticulitis     Time reflects when diagnosis was documented in both MDM as applicable and the Disposition within this note       Time User Action Codes Description Comment    6/21/2024  4:20 PM Britton Pérez Add [K57.92] Diverticulitis           ED Disposition       ED Disposition   Admit    Condition   Stable    Date/Time   Fri Jun 21, 2024 1711    Comment   Case was discussed with isidra  and the patient's admission status was agreed to be Admission Status: inpatient status to the service of Dr. woo .               Follow-up Information    None         Patient's Medications   Discharge Prescriptions    No medications on file       No discharge procedures on file.    PDMP Review       None            ED Provider  Electronically Signed by             Britton Pérez PA-C  06/21/24 4389

## 2024-06-21 NOTE — PLAN OF CARE
Problem: PAIN - ADULT  Goal: Verbalizes/displays adequate comfort level or baseline comfort level  Description: Interventions:  - Encourage patient to monitor pain and request assistance  - Assess pain using appropriate pain scale  - Administer analgesics based on type and severity of pain and evaluate response  - Implement non-pharmacological measures as appropriate and evaluate response  - Consider cultural and social influences on pain and pain management  - Notify physician/advanced practitioner if interventions unsuccessful or patient reports new pain  Outcome: Progressing     Problem: INFECTION - ADULT  Goal: Absence or prevention of progression during hospitalization  Description: INTERVENTIONS:  - Assess and monitor for signs and symptoms of infection  - Monitor lab/diagnostic results  - Monitor all insertion sites, i.e. indwelling lines, tubes, and drains  - Monitor endotracheal if appropriate and nasal secretions for changes in amount and color  - [place] appropriate cooling/warming therapies per order  - Administer medications as ordered  - Instruct and encourage patient and family to use good hand hygiene technique  - Identify and instruct in appropriate isolation precautions for identified infection/condition  Outcome: Progressing  Goal: Absence of fever/infection during neutropenic period  Description: INTERVENTIONS:  - Monitor WBC    Outcome: Progressing

## 2024-06-22 PROBLEM — E66.01 MORBID OBESITY (HCC): Status: ACTIVE | Noted: 2024-06-22

## 2024-06-22 LAB
ANION GAP SERPL CALCULATED.3IONS-SCNC: 6 MMOL/L (ref 4–13)
BASOPHILS # BLD AUTO: 0.05 THOUSANDS/ÂΜL (ref 0–0.1)
BASOPHILS NFR BLD AUTO: 1 % (ref 0–1)
BUN SERPL-MCNC: 9 MG/DL (ref 5–25)
CALCIUM SERPL-MCNC: 8.4 MG/DL (ref 8.4–10.2)
CHLORIDE SERPL-SCNC: 107 MMOL/L (ref 96–108)
CO2 SERPL-SCNC: 24 MMOL/L (ref 21–32)
CREAT SERPL-MCNC: 0.89 MG/DL (ref 0.6–1.3)
EOSINOPHIL # BLD AUTO: 0.35 THOUSAND/ÂΜL (ref 0–0.61)
EOSINOPHIL NFR BLD AUTO: 3 % (ref 0–6)
ERYTHROCYTE [DISTWIDTH] IN BLOOD BY AUTOMATED COUNT: 13.1 % (ref 11.6–15.1)
GFR SERPL CREATININE-BSD FRML MDRD: 100 ML/MIN/1.73SQ M
GLUCOSE SERPL-MCNC: 94 MG/DL (ref 65–140)
HCT VFR BLD AUTO: 41.5 % (ref 36.5–49.3)
HGB BLD-MCNC: 13.5 G/DL (ref 12–17)
IMM GRANULOCYTES # BLD AUTO: 0.03 THOUSAND/UL (ref 0–0.2)
IMM GRANULOCYTES NFR BLD AUTO: 0 % (ref 0–2)
LYMPHOCYTES # BLD AUTO: 2.43 THOUSANDS/ÂΜL (ref 0.6–4.47)
LYMPHOCYTES NFR BLD AUTO: 22 % (ref 14–44)
MCH RBC QN AUTO: 29.6 PG (ref 26.8–34.3)
MCHC RBC AUTO-ENTMCNC: 32.5 G/DL (ref 31.4–37.4)
MCV RBC AUTO: 91 FL (ref 82–98)
MONOCYTES # BLD AUTO: 1.28 THOUSAND/ÂΜL (ref 0.17–1.22)
MONOCYTES NFR BLD AUTO: 12 % (ref 4–12)
NEUTROPHILS # BLD AUTO: 6.87 THOUSANDS/ÂΜL (ref 1.85–7.62)
NEUTS SEG NFR BLD AUTO: 62 % (ref 43–75)
NRBC BLD AUTO-RTO: 0 /100 WBCS
PLATELET # BLD AUTO: 243 THOUSANDS/UL (ref 149–390)
PMV BLD AUTO: 9.8 FL (ref 8.9–12.7)
POTASSIUM SERPL-SCNC: 4 MMOL/L (ref 3.5–5.3)
RBC # BLD AUTO: 4.56 MILLION/UL (ref 3.88–5.62)
SODIUM SERPL-SCNC: 137 MMOL/L (ref 135–147)
WBC # BLD AUTO: 11.01 THOUSAND/UL (ref 4.31–10.16)

## 2024-06-22 PROCEDURE — 80048 BASIC METABOLIC PNL TOTAL CA: CPT | Performed by: STUDENT IN AN ORGANIZED HEALTH CARE EDUCATION/TRAINING PROGRAM

## 2024-06-22 PROCEDURE — 99232 SBSQ HOSP IP/OBS MODERATE 35: CPT | Performed by: SURGERY

## 2024-06-22 PROCEDURE — 85025 COMPLETE CBC W/AUTO DIFF WBC: CPT | Performed by: STUDENT IN AN ORGANIZED HEALTH CARE EDUCATION/TRAINING PROGRAM

## 2024-06-22 RX ORDER — METRONIDAZOLE 500 MG/100ML
500 INJECTION, SOLUTION INTRAVENOUS EVERY 8 HOURS
Status: DISCONTINUED | OUTPATIENT
Start: 2024-06-22 | End: 2024-06-24

## 2024-06-22 RX ADMIN — METRONIDAZOLE 500 MG: 500 INJECTION, SOLUTION INTRAVENOUS at 22:18

## 2024-06-22 RX ADMIN — CEFTRIAXONE 1000 MG: 1 INJECTION, SOLUTION INTRAVENOUS at 17:59

## 2024-06-22 RX ADMIN — METRONIDAZOLE 500 MG: 500 INJECTION, SOLUTION INTRAVENOUS at 13:59

## 2024-06-22 RX ADMIN — ATORVASTATIN CALCIUM 40 MG: 40 TABLET, FILM COATED ORAL at 08:34

## 2024-06-22 RX ADMIN — SODIUM CHLORIDE, SODIUM LACTATE, POTASSIUM CHLORIDE, AND CALCIUM CHLORIDE 125 ML/HR: .6; .31; .03; .02 INJECTION, SOLUTION INTRAVENOUS at 07:50

## 2024-06-22 NOTE — UTILIZATION REVIEW
Initial Clinical Review    Admission: Date/Time/Statement:   Admission Orders (From admission, onward)       Ordered        06/21/24 1708  Inpatient Admission  Once                          Orders Placed This Encounter   Procedures    Inpatient Admission     Standing Status:   Standing     Number of Occurrences:   1     Order Specific Question:   Level of Care     Answer:   Med Surg [16]     Order Specific Question:   Estimated length of stay     Answer:   More than 2 Midnights     Order Specific Question:   Certification     Answer:   I certify that inpatient services are medically necessary for this patient for a duration of greater than two midnights. See H&P and MD Progress Notes for additional information about the patient's course of treatment.     ED Arrival Information       Expected   -    Arrival   6/21/2024 13:30    Acuity   Urgent              Means of arrival   Walk-In    Escorted by   Family Member    Service   Surgery-General    Admission type   Emergency              Arrival complaint   abdominal pain             Chief Complaint   Patient presents with    Abdominal Pain     Pt started with abd pain 2 week ago, admitted for sepsis and diverticulitis. Pt having loose stool. Increased urination        Initial Presentation: 49 y.o. male to ED via walk-in from home  Present to ED with continued to have persistent mild lower abdominal pain and tenderness. newly diagnosed (6/7/24) acute sigmoid diverticulitis with microperforation, treated conservatively. admitted to [facility] from 6/7/2024 to 6/10/2024.  He was discharged home on Cipro and Flagyl. Endorses worsening intermittent episodes of feeling that he has to have a bowel movement but is unable to.  He states that he is passing small amounts of stool daily, but continues to have the sensation that he needs to go.   PMHX: Hyperlipidemia  Admitted to [facility] with DX: diverticulitis   on exam: hypertensive; abdominal tenderness; pain 10/10; leukocytosis of WBC 12.9    CTAP IMP: Worsening inflammatory changes of perforated sigmoid diverticulitis with possible early mural abscess. No drainable fluid collection.    PLAN: cont iv abx; cont ivf; monitor labs; serial abdominal exams; pain / nausea control (see below); clear liq        Date: 6/22/24      Day 2   Pain much improved, passing flatus. Tolerating clear liquid. He had 1 loose bowel movement today, pain at present is described as 0. improved leukocytosis 11.01 ; UA small occult blood.  Conservative nonoperative management at this time. Will go slow with advancing diet.   Plan: cont iv abx; cont ivf; monitor labs; serial abdominal exams; pain / nausea control (see below); cont with clear liq/ f/u blood cx      ED Triage Vitals [06/21/24 1335]   Temperature Pulse Respirations Blood Pressure SpO2 Pain Score   98.3 °F (36.8 °C) 94 16 145/82 96 % 10 - Worst Possible Pain     Weight (last 2 days)       Date/Time Weight    06/21/24 2000 112 (248)    06/21/24 1335 113 (248.02)            Vital Signs (last 3 days)       Date/Time Temp Pulse Resp BP MAP (mmHg) SpO2 O2 Device Patient Position - Orthostatic VS Pain    06/22/24 11:45:03 98.1 °F (36.7 °C) 77 18 127/78 94 98 % -- -- --    06/22/24 0834 -- -- -- -- -- 99 % None (Room air) -- 3    06/22/24 07:03:33 98.2 °F (36.8 °C) 70 18 131/81 98 98 % None (Room air) Lying --    06/21/24 22:38:55 -- 64 16 115/61 79 -- -- Sitting --    06/21/24 2155 -- -- -- -- -- -- -- -- 8    06/21/24 2000 -- -- -- -- -- -- None (Room air) -- --    06/21/24 1823 -- -- -- -- -- -- -- -- 5    06/21/24 1745 98.2 °F (36.8 °C) 80 16 141/72 96 96 % None (Room air) Lying --    06/21/24 1640 -- -- -- -- -- -- -- -- 7    06/21/24 1630 -- 82 17 161/82 110 99 % None (Room air) Lying --    06/21/24 1515 -- 95 16 163/74 107 98 % None (Room air) Lying --    06/21/24 1445 -- 80 17 130/76 97 96 % None (Room air) Lying --    06/21/24 1435 -- -- -- -- -- -- -- -- 5    06/21/24 1416 -- -- -- -- -- -- -- -- 9    06/21/24  1335 98.3 °F (36.8 °C) 94 16 145/82 -- 96 % None (Room air) -- 10 - Worst Possible Pain              Pertinent Labs/Diagnostic Test Results:   Radiology:  CT abdomen pelvis with contrast   Final Interpretation by Aden Alexander MD (06/21 1611)      Worsening inflammatory changes of perforated sigmoid diverticulitis with possible early mural abscess. No drainable fluid collection.      The study was marked in EPIC for immediate notification.      Resident: LYNDSEY CHENG I, the attending radiologist, have reviewed the images and agree with the final report above.      Workstation performed: [id number]              Results from last 7 days   Lab Units 06/22/24  0444 06/21/24  1346   WBC Thousand/uL 11.01* 12.93*   HEMOGLOBIN g/dL 13.5 13.9   HEMATOCRIT % 41.5 42.1   PLATELETS Thousands/uL 243 306   TOTAL NEUT ABS Thousands/µL 6.87 9.22*        Results from last 7 days   Lab Units 06/22/24  0444 06/21/24  1346   SODIUM mmol/L 137 136   POTASSIUM mmol/L 4.0 3.8   CHLORIDE mmol/L 107 101   CO2 mmol/L 24 30   ANION GAP mmol/L 6 5   BUN mg/dL 9 11   CREATININE mg/dL 0.89 1.00   EGFR ml/min/1.73sq m 100 87   CALCIUM mg/dL 8.4 9.2     Results from last 7 days   Lab Units 06/21/24  1346   AST U/L 17   ALT U/L 25   ALK PHOS U/L 46   TOTAL PROTEIN g/dL 7.4   ALBUMIN g/dL 4.0   TOTAL BILIRUBIN mg/dL 1.10*        Results from last 7 days   Lab Units 06/22/24  0444 06/21/24  1346   GLUCOSE RANDOM mg/dL 94 122        Results from last 7 days   Lab Units 06/21/24  1355   LACTIC ACID mmol/L 0.8        Results from last 7 days   Lab Units 06/21/24  1346   LIPASE u/L 64      Results from last 7 days   Lab Units 06/21/24  1401   CLARITY UA  Clear   COLOR UA  Yellow   SPEC GRAV UA  <=1.005   PH UA  6.5   GLUCOSE UA mg/dl Negative   KETONES UA mg/dl Negative   BLOOD UA  Small*   PROTEIN UA mg/dl Negative   NITRITE UA  Negative   BILIRUBIN UA  Negative   UROBILINOGEN UA E.U./dl 0.2   LEUKOCYTES UA  Negative   WBC UA /hpf  None Seen   RBC UA /hpf 2-4   BACTERIA UA /hpf None Seen   EPITHELIAL CELLS WET PREP /hpf Occasional        Results from last 7 days   Lab Units 06/21/24  1411 06/21/24  1406   BLOOD CULTURE  Received in Microbiology Lab. Culture in Progress. Received in Microbiology Lab. Culture in Progress.          ED Treatment-Medication Administration from 06/21/2024 1327 to 06/21/2024 1821         Date/Time Order Dose Route Action     06/21/2024 1416 sodium chloride 0.9 % bolus 1,000 mL 1,000 mL Intravenous New Bag     06/21/2024 1416 morphine injection 4 mg 4 mg Intravenous Given     06/21/2024 1512 iohexol (OMNIPAQUE) 350 MG/ML injection (MULTI-DOSE) 100 mL 100 mL Intravenous Given     06/21/2024 1640 sodium chloride 0.9 % bolus 1,000 mL 1,000 mL Intravenous New Bag     06/21/2024 1640 piperacillin-tazobactam (ZOSYN) 4.5 g in sodium chloride 0.9 % 100 mL IVPB 4.5 g Intravenous New Bag            Past Medical History:   Diagnosis Date    Diverticulitis      Admitting Diagnosis: Diverticulitis [K57.92]  Abdominal pain [R10.9]    Age/Sex: 49 y.o. male    Admission Orders: SCDs; I/O; clear liq     Scheduled Medications:  atorvastatin, 40 mg, Oral, Daily  cefTRIAXone, 1,000 mg, Intravenous, Q24H  heparin (porcine), 5,000 Units, Subcutaneous, Q8H DILCIA      Continuous IV Infusions:  lactated ringers, 125 mL/hr, Intravenous, Continuous      PRN Meds:  acetaminophen, 650 mg, Oral, Q6H PRN  ibuprofen, 600 mg, Oral, Q6H PRN  (6/21 recd x1)   morphine injection, 4 mg, Intravenous, Q3H PRN  ondansetron, 4 mg, Intravenous, Q6H PRN      Network Utilization Review Department  ATTENTION: Please call with any questions or concerns to [phone] and carefully listen to the prompts so that you are directed to the right person. All voicemails are confidential.   For Discharge needs, contact Care Management DC Support Team at [phone] opt. [phone]  Send all requests for admission clinical reviews, approved or denied determinations and any other  requests to dedicated fax number below belonging to the campus where the patient is receiving treatment. List of dedicated fax numbers for the Facilities:  FACILITY NAME UR FAX NUMBER   ADMISSION DENIALS (Administrative/Medical Necessity) [phone]   DISCHARGE SUPPORT TEAM (NETWORK) [phone]   PARENT CHILD HEALTH (Maternity/NICU/Pediatrics) [phone]   [facility] [phone]   [facility] [phone]   [facility] [phone]   [facility] [phone]   [facility] [phone]   [facility] [phone]   [facility] [phone]   [facility] [phone]   [facility] [phone]   [facility] [phone]   [facility] [phone]   [facility] [phone]

## 2024-06-22 NOTE — PLAN OF CARE
Problem: PAIN - ADULT  Goal: Verbalizes/displays adequate comfort level or baseline comfort level  Description: Interventions:  - Encourage patient to monitor pain and request assistance  - Assess pain using appropriate pain scale  - Administer analgesics based on type and severity of pain and evaluate response  - Implement non-pharmacological measures as appropriate and evaluate response  - Consider cultural and social influences on pain and pain management  - Notify physician/advanced practitioner if interventions unsuccessful or patient reports new pain  Outcome: Progressing     Problem: INFECTION - ADULT  Goal: Absence or prevention of progression during hospitalization  Description: INTERVENTIONS:  - Assess and monitor for signs and symptoms of infection  - Monitor lab/diagnostic results  - Monitor all insertion sites, i.e. indwelling lines, tubes, and drains  - Monitor endotracheal if appropriate and nasal secretions for changes in amount and color  - [place] appropriate cooling/warming therapies per order  - Administer medications as ordered  - Instruct and encourage patient and family to use good hand hygiene technique  - Identify and instruct in appropriate isolation precautions for identified infection/condition  Outcome: Progressing  Goal: Absence of fever/infection during neutropenic period  Description: INTERVENTIONS:  - Monitor WBC    Outcome: Progressing     Problem: SAFETY ADULT  Goal: Patient will remain free of falls  Description: INTERVENTIONS:  - Educate patient/family on patient safety including physical limitations  - Instruct patient to call for assistance with activity   - Consult OT/PT to assist with strengthening/mobility   - Keep Call bell within reach  - Keep bed low and locked with side rails adjusted as appropriate  - Keep care items and personal belongings within reach  - Initiate and maintain comfort rounds  - Make Fall Risk Sign visible to staff  - Offer Toileting every  Hours,  in advance of need  - Initiate/Maintain alarm  - Obtain necessary fall risk management equipment:   - Apply yellow socks and bracelet for high fall risk patients  - Consider moving patient to room near nurses station  Outcome: Progressing  Goal: Maintain or return to baseline ADL function  Description: INTERVENTIONS:  -  Assess patient's ability to carry out ADLs; assess patient's baseline for ADL function and identify physical deficits which impact ability to perform ADLs (bathing, care of mouth/teeth, toileting, grooming, dressing, etc.)  - Assess/evaluate cause of self-care deficits   - Assess range of motion  - Assess patient's mobility; develop plan if impaired  - Assess patient's need for assistive devices and provide as appropriate  - Encourage maximum independence but intervene and supervise when necessary  - Involve family in performance of ADLs  - Assess for home care needs following discharge   - Consider OT consult to assist with ADL evaluation and planning for discharge  - Provide patient education as appropriate  Outcome: Progressing  Goal: Maintains/Returns to pre admission functional level  Description: INTERVENTIONS:  - Perform AM-PAC 6 Click Basic Mobility/ Daily Activity assessment daily.  - Set and communicate daily mobility goal to care team and patient/family/caregiver.   - Collaborate with rehabilitation services on mobility goals if consulted  - Perform Range of Motion  times a day.  - Reposition patient every  hours.  - Dangle patient times a day   - Stand patient  times a day  - Ambulate patient  times a day  - Out of bed to chair  times a day   - Out of bed for meals  times a day  - Out of bed for toileting  - Record patient progress and toleration of activity level   Outcome: Progressing     Problem: DISCHARGE PLANNING  Goal: Discharge to home or other facility with appropriate resources  Description: INTERVENTIONS:  - Identify barriers to discharge w/patient and caregiver  - Arrange for  needed discharge resources and transportation as appropriate  - Identify discharge learning needs (meds, wound care, etc.)  - Arrange for interpretive services to assist at discharge as needed  - Refer to Case Management Department for coordinating discharge planning if the patient needs post-hospital services based on physician/advanced practitioner order or complex needs related to functional status, cognitive ability, or social support system  Outcome: Progressing     Problem: Knowledge Deficit  Goal: Patient/family/caregiver demonstrates understanding of disease process, treatment plan, medications, and discharge instructions  Description: Complete learning assessment and assess knowledge base.  Interventions:  - Provide teaching at level of understanding  - Provide teaching via preferred learning methods  Outcome: Progressing

## 2024-06-22 NOTE — PLAN OF CARE
Problem: PAIN - ADULT  Goal: Verbalizes/displays adequate comfort level or baseline comfort level  Description: Interventions:  - Encourage patient to monitor pain and request assistance  - Assess pain using appropriate pain scale  - Administer analgesics based on type and severity of pain and evaluate response  - Implement non-pharmacological measures as appropriate and evaluate response  - Consider cultural and social influences on pain and pain management  - Notify physician/advanced practitioner if interventions unsuccessful or patient reports new pain  Outcome: Progressing     Problem: INFECTION - ADULT  Goal: Absence or prevention of progression during hospitalization  Description: INTERVENTIONS:  - Assess and monitor for signs and symptoms of infection  - Monitor lab/diagnostic results  - Monitor all insertion sites, i.e. indwelling lines, tubes, and drains  - Monitor endotracheal if appropriate and nasal secretions for changes in amount and color  - [place] appropriate cooling/warming therapies per order  - Administer medications as ordered  - Instruct and encourage patient and family to use good hand hygiene technique  - Identify and instruct in appropriate isolation precautions for identified infection/condition  Outcome: Progressing  Goal: Absence of fever/infection during neutropenic period  Description: INTERVENTIONS:  - Monitor WBC    Outcome: Progressing     Problem: SAFETY ADULT  Goal: Patient will remain free of falls  Description: INTERVENTIONS:  - Educate patient/family on patient safety including physical limitations  - Instruct patient to call for assistance with activity   - Consult OT/PT to assist with strengthening/mobility   - Keep Call bell within reach  - Keep bed low and locked with side rails adjusted as appropriate  - Keep care items and personal belongings within reach  - Initiate and maintain comfort rounds  - Make Fall Risk Sign visible to staff  - Offer Toileting every  Hours,  in advance of need  - Initiate/Maintain alarm  - Obtain necessary fall risk management equipment  - Apply yellow socks and bracelet for high fall risk patients  - Consider moving patient to room near nurses station  Outcome: Progressing  Goal: Maintain or return to baseline ADL function  Description: INTERVENTIONS:  -  Assess patient's ability to carry out ADLs; assess patient's baseline for ADL function and identify physical deficits which impact ability to perform ADLs (bathing, care of mouth/teeth, toileting, grooming, dressing, etc.)  - Assess/evaluate cause of self-care deficits   - Assess range of motion  - Assess patient's mobility; develop plan if impaired  - Assess patient's need for assistive devices and provide as appropriate  - Encourage maximum independence but intervene and supervise when necessary  - Involve family in performance of ADLs  - Assess for home care needs following discharge   - Consider OT consult to assist with ADL evaluation and planning for discharge  - Provide patient education as appropriate  Outcome: Progressing  Goal: Maintains/Returns to pre admission functional level  Description: INTERVENTIONS:  - Perform AM-PAC 6 Click Basic Mobility/ Daily Activity assessment daily.  - Set and communicate daily mobility goal to care team and patient/family/caregiver.   - Collaborate with rehabilitation services on mobility goals if consulted  - Perform Range of Motion  times a day.  - Reposition patient every  hours.  - Dangle patient  times a day  - Stand patient  times a day  - Ambulate patient  times a day  - Out of bed to chair  times a day   - Out of bed for meals  times a day  - Out of bed for toileting  - Record patient progress and toleration of activity level   Outcome: Progressing     Problem: DISCHARGE PLANNING  Goal: Discharge to home or other facility with appropriate resources  Description: INTERVENTIONS:  - Identify barriers to discharge w/patient and caregiver  - Arrange for  needed discharge resources and transportation as appropriate  - Identify discharge learning needs (meds, wound care, etc.)  - Arrange for interpretive services to assist at discharge as needed  - Refer to Case Management Department for coordinating discharge planning if the patient needs post-hospital services based on physician/advanced practitioner order or complex needs related to functional status, cognitive ability, or social support system  Outcome: Progressing     Problem: Knowledge Deficit  Goal: Patient/family/caregiver demonstrates understanding of disease process, treatment plan, medications, and discharge instructions  Description: Complete learning assessment and assess knowledge base.  Interventions:  - Provide teaching at level of understanding  - Provide teaching via preferred learning methods  Outcome: Progressing

## 2024-06-22 NOTE — PROGRESS NOTES
Progress Note - General Surgery   Edsandrine Velazquez 49 y.o. male MRN: [id number]  Unit/Bed#: -01 Encounter: [id number]    Assessment:    49-year-old obese male admitted here from June 7 and discharged on the 10th with newly diagnosed acute sigmoid diverticulitis with microperforation.  He was discharged home on Cipro Floxin and metronidazole which she just completed yesterday.  Patient continues to have persistent mild lower abdominal pain and tenderness, again came back to the emergency department on June 13 and CT showed some improvement in the amount of inflammation.  Readmitted yesterday after being seen in the emergency department, evaluated by Dr. Woo and CT scan abdomen and pelvis showed sigmoid diverticulitis with interval worsening of pericolonic fat stranding and adjacent phlegmon and possible early mural abscess development without obvious drainable fluid collection.  Patient with initial leukocytosis 12.9, he remains afebrile.  Tolerating clear liquid.  He had 1 loose bowel movement today, pain at present is described as 0.    Labs today showed CBC with improved leukocytosis 11.01  BMP normal electrolytes  Creatinine 0.89  Blood cultures x 2 pending  UA small occult blood  Lactic acid in ED 0.8    Vital signs stable, afebrile.  Weight 112 kg, BMI 43.93    Plan:  Conservative nonoperative management at this time, this is the patient's first bout of acute sigmoid diverticulitis with microperforation.  Last colonoscopy was performed in [place] in 2023, he had only noted a colon polyp.  Does not believe he was told that he had diverticular disease.  Will go slow with advancing diet.  He had 1 bout of loose watery stool today.  Will keep him on clear liquids for today, possible full liquids later in the day.  Abdominal pain much improved, continue present as needed analgesics and antiemetics.  Maintain the patient on present IV antibiotics, ceftriaxone 1 g daily  Will also add metronidazole 500  mg IV every 8 hours  IV fluids for hydration, LR at 125 mL/h  Repeat a.m. labs including CBC, BMP  If the patient clinically worsens then he may need reimaging or evaluation by IR for drainage if any fluid collection or worsening of early mural abscess development noted on most recent CT imaging.    Subjective/Objective     Chief Complaint: Pain much improved, passing flatus.  Tolerating clears.  Had 1 watery bowel movement today.    Subjective: 49-year-old male seen to the ED yesterday with recurrent worsening intermittent of abdominal pain, mostly in the mid lower abdomen.  Denies any fever or chills.  He had been hospitalized here from June 7 to 10 and discharged, represented again to the ED on June 13 where CT showed improvement in the amount of inflammation and air present, 1 week after that he was then again seen with worsening abdominal pain and tenderness yesterday where CT abdomen and pelvis showed worsening interval pericolonic fat stranding and adjacent phlegmon with possible early mural abscess development.  He was started on IV ceftriaxone and evaluated by general surgery, today he does feel improved, had a loose bowel movement mostly water.  Passing flatus.  Noticed some stomach gurgling.  No fever or chills.    Scheduled Meds:  Current Facility-Administered Medications   Medication Dose Route Frequency Provider Last Rate    acetaminophen  650 mg Oral Q6H PRN Nani Woo, DO      atorvastatin  40 mg Oral Daily Nani Woo, DO      cefTRIAXone  1,000 mg Intravenous Q24H Nani Woo, DO 1,000 mg (06/21/24 2104)    heparin (porcine)  5,000 Units Subcutaneous Q8H DIL[facility] Nani Woo, DO      ibuprofen  600 mg Oral Q6H PRN Nani Quiroz [place], DO      lactated ringers  125 mL/hr Intravenous Continuous Nani Woo,  mL/hr (06/22/24 0750)    metroNIDAZOLE  500 mg Intravenous Q8H Misbah Nelson PA-C      morphine injection  4 mg  "Intravenous Q3H PRN Nani Woo DO      ondansetron  4 mg Intravenous Q6H PRN Nani Woo DO       Continuous Infusions:lactated ringers, 125 mL/hr, Last Rate: 125 mL/hr (06/22/24 0750)      PRN Meds:.  acetaminophen    ibuprofen    morphine injection    ondansetron      Objective:     Blood pressure 127/78, pulse 77, temperature 98.1 °F (36.7 °C), resp. rate 18, height 5' 3\" (1.6 m), weight 112 kg (248 lb), SpO2 98%.,Body mass index is 43.93 kg/m².      Intake/Output Summary (Last 24 hours) at 6/22/2024 1332  Last data filed at 6/22/2024 0834  Gross per 24 hour   Intake 1802.92 ml   Output --   Net 1802.92 ml       Invasive Devices       Peripheral Intravenous Line  Duration             Peripheral IV 06/21/24 Left;Upper;Ventral (anterior) Arm <1 day                    Physical Exam:     Awake, sitting up in bedside chair.  Appears comfortable, no acute distress.  Not ill-appearing.  ENT clear.  Neck supple, trachea midline.  Chest symmetric rise.  Heart regular, no murmur.  Lungs CTA.  Abdomen obese.  Positive bowel sounds, soft.  Lower mid abdomen with mild tenderness but no guarding or rebound.  No masses are felt.  No abdominal hernias are noted.  Extremities no calf tenderness, no peripheral edema.  Feet are warm to touch.  Ambulation not witnessed.  Neurological fully oriented.  No tremor.  Mental status appropriate.    Lab, Imaging and other studies:I have personally reviewed pertinent lab results.  , CBC:   Lab Results   Component Value Date    WBC 11.01 (H) 06/22/2024    HGB 13.5 06/22/2024    HCT 41.5 06/22/2024    MCV 91 06/22/2024     06/22/2024    RBC 4.56 06/22/2024    MCH 29.6 06/22/2024    MCHC 32.5 06/22/2024    RDW 13.1 06/22/2024    MPV 9.8 06/22/2024    NRBC 0 06/22/2024   , CMP:   Lab Results   Component Value Date    SODIUM 137 06/22/2024    K 4.0 06/22/2024     06/22/2024    CO2 24 06/22/2024    BUN 9 06/22/2024    CREATININE 0.89 06/22/2024    CALCIUM " 8.4 06/22/2024    AST 17 06/21/2024    ALT 25 06/21/2024    ALKPHOS 46 06/21/2024    EGFR 100 06/22/2024     VTE Pharmacologic Prophylaxis: Heparin   VTE Mechanical Prophylaxis: sequential compression device    Misbah Nelson PA-C

## 2024-06-22 NOTE — UTILIZATION REVIEW
NOTIFICATION OF INPATIENT ADMISSION   AUTHORIZATION REQUEST   SERVICING FACILITY:   [place] [address]  Tax ID: [id number]  NPI: [id number] ATTENDING PROVIDER:  Attending Name and NPI#: Nani Woo Do [[id number]]  Address: [address] [place] [address]  Phone: [phone]   ADMISSION INFORMATION:  Place of Service: Inpatient [facility]  Place of Service Code: 21  Inpatient Admission Date/Time: 6/21/24  5:08 PM  Discharge Date/Time: No discharge date for patient encounter.  Admitting Diagnosis Code/Description:  Diverticulitis [K57.92]  Abdominal pain [R10.9]     UTILIZATION REVIEW CONTACT:  Celia Viera, Utilization   Network Utilization Review Department  Phone: [phone]  Fax [phone]  Email: [email or web address]  Contact for approvals/pending authorizations, clinical reviews, and discharge.     PHYSICIAN ADVISORY SERVICES:  Medical Necessity Denial & Bytf-mg-Khae Review  Phone: [phone]  Fax: [phone]  Email: [email or web address]     DISCHARGE SUPPORT TEAM:  For Patients Discharge Needs & Updates  Phone: [phone] opt. 2 Fax: [phone]  Email: [email or web address]

## 2024-06-22 NOTE — UTILIZATION REVIEW
NOTIFICATION OF INPATIENT ADMISSION   AUTHORIZATION REQUEST   SERVICING FACILITY:   [place] [address]  Tax ID: [id number]  NPI: [id number] ATTENDING PROVIDER:  Attending Name and NPI#: Nnai Woo Do [[id number]]  Address: [address] [place] [address]  Phone: [phone]   ADMISSION INFORMATION:  Place of Service: Inpatient [facility]  Place of Service Code: 21  Inpatient Admission Date/Time: 6/21/24  5:08 PM  Discharge Date/Time: No discharge date for patient encounter.  Admitting Diagnosis Code/Description:  Diverticulitis [K57.92]  Abdominal pain [R10.9]     UTILIZATION REVIEW CONTACT:  Celia Viera, Utilization   Network Utilization Review Department  Phone: [phone]  Fax [phone]  Email: [email or web address]  Contact for approvals/pending authorizations, clinical reviews, and discharge.     PHYSICIAN ADVISORY SERVICES:  Medical Necessity Denial & Mstg-lp-Fbfh Review  Phone: [phone]  Fax: [phone]  Email: [email or web address]     DISCHARGE SUPPORT TEAM:  For Patients Discharge Needs & Updates  Phone: [phone] opt. 2 Fax: [phone]  Email: [email or web address]

## 2024-06-22 NOTE — PLAN OF CARE
Problem: PAIN - ADULT  Goal: Verbalizes/displays adequate comfort level or baseline comfort level  Description: Interventions:  - Encourage patient to monitor pain and request assistance  - Assess pain using appropriate pain scale  - Administer analgesics based on type and severity of pain and evaluate response  - Implement non-pharmacological measures as appropriate and evaluate response  - Consider cultural and social influences on pain and pain management  - Notify physician/advanced practitioner if interventions unsuccessful or patient reports new pain  Outcome: Progressing     Problem: INFECTION - ADULT  Goal: Absence or prevention of progression during hospitalization  Description: INTERVENTIONS:  - Assess and monitor for signs and symptoms of infection  - Monitor lab/diagnostic results  - Monitor all insertion sites, i.e. indwelling lines, tubes, and drains  - Monitor endotracheal if appropriate and nasal secretions for changes in amount and color  - [place] appropriate cooling/warming therapies per order  - Administer medications as ordered  - Instruct and encourage patient and family to use good hand hygiene technique  - Identify and instruct in appropriate isolation precautions for identified infection/condition  Outcome: Progressing

## 2024-06-23 LAB
ANION GAP SERPL CALCULATED.3IONS-SCNC: 6 MMOL/L (ref 4–13)
BASOPHILS # BLD AUTO: 0.06 THOUSANDS/ÂΜL (ref 0–0.1)
BASOPHILS NFR BLD AUTO: 1 % (ref 0–1)
BUN SERPL-MCNC: 7 MG/DL (ref 5–25)
CALCIUM SERPL-MCNC: 8.7 MG/DL (ref 8.4–10.2)
CHLORIDE SERPL-SCNC: 105 MMOL/L (ref 96–108)
CO2 SERPL-SCNC: 29 MMOL/L (ref 21–32)
CREAT SERPL-MCNC: 0.92 MG/DL (ref 0.6–1.3)
EOSINOPHIL # BLD AUTO: 0.32 THOUSAND/ÂΜL (ref 0–0.61)
EOSINOPHIL NFR BLD AUTO: 4 % (ref 0–6)
ERYTHROCYTE [DISTWIDTH] IN BLOOD BY AUTOMATED COUNT: 12.9 % (ref 11.6–15.1)
GFR SERPL CREATININE-BSD FRML MDRD: 97 ML/MIN/1.73SQ M
GLUCOSE SERPL-MCNC: 95 MG/DL (ref 65–140)
HCT VFR BLD AUTO: 41.2 % (ref 36.5–49.3)
HGB BLD-MCNC: 13.5 G/DL (ref 12–17)
IMM GRANULOCYTES # BLD AUTO: 0.03 THOUSAND/UL (ref 0–0.2)
IMM GRANULOCYTES NFR BLD AUTO: 0 % (ref 0–2)
LYMPHOCYTES # BLD AUTO: 1.93 THOUSANDS/ÂΜL (ref 0.6–4.47)
LYMPHOCYTES NFR BLD AUTO: 26 % (ref 14–44)
MAGNESIUM SERPL-MCNC: 2.1 MG/DL (ref 1.9–2.7)
MCH RBC QN AUTO: 29 PG (ref 26.8–34.3)
MCHC RBC AUTO-ENTMCNC: 32.8 G/DL (ref 31.4–37.4)
MCV RBC AUTO: 88 FL (ref 82–98)
MONOCYTES # BLD AUTO: 0.8 THOUSAND/ÂΜL (ref 0.17–1.22)
MONOCYTES NFR BLD AUTO: 11 % (ref 4–12)
NEUTROPHILS # BLD AUTO: 4.28 THOUSANDS/ÂΜL (ref 1.85–7.62)
NEUTS SEG NFR BLD AUTO: 58 % (ref 43–75)
NRBC BLD AUTO-RTO: 0 /100 WBCS
PLATELET # BLD AUTO: 263 THOUSANDS/UL (ref 149–390)
PMV BLD AUTO: 9.9 FL (ref 8.9–12.7)
POTASSIUM SERPL-SCNC: 3.8 MMOL/L (ref 3.5–5.3)
RBC # BLD AUTO: 4.66 MILLION/UL (ref 3.88–5.62)
SODIUM SERPL-SCNC: 140 MMOL/L (ref 135–147)
WBC # BLD AUTO: 7.42 THOUSAND/UL (ref 4.31–10.16)

## 2024-06-23 PROCEDURE — 99232 SBSQ HOSP IP/OBS MODERATE 35: CPT

## 2024-06-23 PROCEDURE — 83735 ASSAY OF MAGNESIUM: CPT

## 2024-06-23 PROCEDURE — 80048 BASIC METABOLIC PNL TOTAL CA: CPT

## 2024-06-23 PROCEDURE — 85025 COMPLETE CBC W/AUTO DIFF WBC: CPT

## 2024-06-23 RX ORDER — SIMETHICONE 80 MG
80 TABLET,CHEWABLE ORAL EVERY 6 HOURS PRN
Status: DISCONTINUED | OUTPATIENT
Start: 2024-06-23 | End: 2024-06-24 | Stop reason: HOSPADM

## 2024-06-23 RX ADMIN — METRONIDAZOLE 500 MG: 500 INJECTION, SOLUTION INTRAVENOUS at 06:08

## 2024-06-23 RX ADMIN — METRONIDAZOLE 500 MG: 500 INJECTION, SOLUTION INTRAVENOUS at 21:40

## 2024-06-23 RX ADMIN — SIMETHICONE 80 MG: 80 TABLET, CHEWABLE ORAL at 06:31

## 2024-06-23 RX ADMIN — ATORVASTATIN CALCIUM 40 MG: 40 TABLET, FILM COATED ORAL at 09:50

## 2024-06-23 RX ADMIN — METRONIDAZOLE 500 MG: 500 INJECTION, SOLUTION INTRAVENOUS at 13:52

## 2024-06-23 RX ADMIN — ACETAMINOPHEN 650 MG: 325 TABLET ORAL at 14:35

## 2024-06-23 RX ADMIN — CEFTRIAXONE 1000 MG: 1 INJECTION, SOLUTION INTRAVENOUS at 18:07

## 2024-06-23 NOTE — PROGRESS NOTES
Progress Note - General Surgery   Edsandrine Velazquez 49 y.o. male MRN: [id number]  Unit/Bed#: -01 Encounter: [id number]    Assessment:    Mid lower abdominal pain still present but overall improved.  Describes his pain as a 2 on a 10 point pain scale.  He had bowel movement today, some formed stool but loose.  No melena.  Tolerated full liquid toast and crackers.  IV fluids were lowered yesterday.  The patient is adamant that he really is trying to avoid the need for surgery and is very fearful about a colostomy if needed.  He is willing to do what ever it takes to try conservative nonoperative management despite again being readmitted to the hospital.  He remains on IV antibiotics with ceftriaxone and also added IV metronidazole.  Afebrile.    49-year-old obese male admitted here from June 7 and discharged on the 10th with newly diagnosed acute sigmoid diverticulitis with microperforation.  He was discharged home on Cipro Floxin and metronidazole which she just completed yesterday.  Patient continues to have persistent mild lower abdominal pain and tenderness, again came back to the emergency department on June 13 and CT showed some improvement in the amount of inflammation.  Readmitted yesterday after being seen in the emergency department, evaluated by Dr. Woo and CT scan abdomen and pelvis showed sigmoid diverticulitis with interval worsening of pericolonic fat stranding and adjacent phlegmon and possible early mural abscess development without obvious drainable fluid collection.  Patient with initial leukocytosis 12.9, he remains afebrile.  Tolerating clear liquid.       Labs today showed CBC normal white count 7.42 (11.01)  BMP entirely normal, creatinine 0.92  Blood cultures x 2 pending, no growth at 24 hours  UA small occult blood     Vital signs stable, afebrile.  Weight 112 kg, BMI 43.93     Plan:  Conservative present nonoperative management at this time  Will go slow with advancing diet.  He had  1 bout of loose watery stool today.  Will keep him on full liquid toast and crackers today, go slow with advancing diet given his hospital readmission for same with smoldering acute on chronic diverticular disease.  Abdominal pain improved today, having bowel function., continue present as needed analgesics and antiemetics.  Maintain the patient on present IV antibiotics, ceftriaxone and metronidazole  Will discontinue IV fluids today  Repeat a.m. labs including CBC, BMP, magnesium  Personally updated Dr. Moreon via telephone    If the patient clinically worsens then he may need reimaging or evaluation by IR for drainage if any fluid collection or worsening of early mural abscess development noted on most recent CT imaging.      Subjective/Objective     Chief Complaint:One loose watery bowel movement today, pain seem to be improved afterwards.  No fever or chills.  Tolerating full liquids toast and crackers.    Subjective: No overnight events.  Patient is willing to do what ever it takes to avoid surgery.  Some improvement with pain, controlled with current analgesics.  Also relieved after 1 loose bowel movement today.  Right now pain is a 2 on a 10 scale.  No nausea.  Tolerating full liquids.  He feels like he has to pass flatus but cannot.    Scheduled Meds:  Current Facility-Administered Medications   Medication Dose Route Frequency Provider Last Rate    acetaminophen  650 mg Oral Q6H PRN Nani Woo, DO      atorvastatin  40 mg Oral Daily Nani Woo, DO      cefTRIAXone  1,000 mg Intravenous Q24H Nani Woo, DO 1,000 mg (06/22/24 1759)    heparin (porcine)  5,000 Units Subcutaneous Q8H [facility] Nani Woo, DO      ibuprofen  600 mg Oral Q6H PRN Nani Woo, DO      lactated ringers  50 mL/hr Intravenous Continuous Kaushal Moreno MD Stopped (06/22/24 1800)    metroNIDAZOLE  500 mg Intravenous Q8H Misbah Nelson PA-C 500 mg (06/23/24 0608)     "morphine injection  4 mg Intravenous Q3H PRN Nani Woo, DO      ondansetron  4 mg Intravenous Q6H PRN Nani Woo, DO      simethicone  80 mg Oral Q6H PRN Kaushal Moreno MD       Continuous Infusions:lactated ringers, 50 mL/hr, Last Rate: Stopped (06/22/24 1800)      PRN Meds:.  acetaminophen    ibuprofen    morphine injection    ondansetron    simethicone      Objective:     Blood pressure 148/92, pulse 78, temperature 98.1 °F (36.7 °C), resp. rate 18, height 5' 3\" (1.6 m), weight 112 kg (248 lb), SpO2 96%.,Body mass index is 43.93 kg/m².    I/O         06/21 0701  06/22 0700 06/22 0701  06/23 0700 06/23 0701  06/24 0700    P.O.  1520     I.V. (mL/kg)  2295 (20.5)     Total Intake(mL/kg)  3815 (34.1)     Net  +3815            Unmeasured Urine Occurrence 2 x 8 x     Unmeasured Stool Occurrence 1 x 1 x                 Invasive Devices       Peripheral Intravenous Line  Duration             Peripheral IV 06/21/24 Left;Upper;Ventral (anterior) Arm 1 day                    Physical Exam:       Awake, no acute distress.  Appears comfortable.  Body habitus obese with BMI 43.93.  ENT clear.  Unremarkable.  Oral mucosa moist.  Neck supple  Chest symmetric rise.  Heart RRR  Lungs clear to auscultation.  Abdomen obese configuration, round and distended..  Positive bowel sounds, soft.  Lower mid abdomen with mild tenderness just below the umbilicus, no abdominal masses or hernia.  No peritoneal signs.    Extremities no calf tenderness, no peripheral edema.  Feet are warm to touch.    Neurological fully oriented.  No tremor.  Mental status appropriate.    Lab, Imaging and other studies:I have personally reviewed pertinent lab results.  , CBC:   Lab Results   Component Value Date    WBC 7.42 06/23/2024    HGB 13.5 06/23/2024    HCT 41.2 06/23/2024    MCV 88 06/23/2024     06/23/2024    RBC 4.66 06/23/2024    MCH 29.0 06/23/2024    MCHC 32.8 06/23/2024    RDW 12.9 06/23/2024    MPV 9.9 " 06/23/2024    NRBC 0 06/23/2024   , CMP:   Lab Results   Component Value Date    SODIUM 140 06/23/2024    K 3.8 06/23/2024     06/23/2024    CO2 29 06/23/2024    BUN 7 06/23/2024    CREATININE 0.92 06/23/2024    CALCIUM 8.7 06/23/2024    EGFR 97 06/23/2024     VTE Pharmacologic Prophylaxis: Enoxaparin (Lovenox)  VTE Mechanical Prophylaxis: sequential compression device    Misbah Nelson PA-C

## 2024-06-23 NOTE — NURSING NOTE
Patient having increased abdominal pain due to gas and feeling more bloated. Unable to pass gas without force. Having bowel movements, surgery attending made aware. New order for simethicone noted and provided to patient, encouraged to ambulate more and patient ambulating independently in hallway.

## 2024-06-23 NOTE — PLAN OF CARE
Problem: PAIN - ADULT  Goal: Verbalizes/displays adequate comfort level or baseline comfort level  Description: Interventions:  - Encourage patient to monitor pain and request assistance  - Assess pain using appropriate pain scale  - Administer analgesics based on type and severity of pain and evaluate response  - Implement non-pharmacological measures as appropriate and evaluate response  - Consider cultural and social influences on pain and pain management  - Notify physician/advanced practitioner if interventions unsuccessful or patient reports new pain  Outcome: Progressing     Problem: INFECTION - ADULT  Goal: Absence or prevention of progression during hospitalization  Description: INTERVENTIONS:  - Assess and monitor for signs and symptoms of infection  - Monitor lab/diagnostic results  - Monitor all insertion sites, i.e. indwelling lines, tubes, and drains  - Monitor endotracheal if appropriate and nasal secretions for changes in amount and color  - [place] appropriate cooling/warming therapies per order  - Administer medications as ordered  - Instruct and encourage patient and family to use good hand hygiene technique  - Identify and instruct in appropriate isolation precautions for identified infection/condition  Outcome: Progressing  Goal: Absence of fever/infection during neutropenic period  Description: INTERVENTIONS:  - Monitor WBC    Outcome: Progressing     Problem: SAFETY ADULT  Goal: Patient will remain free of falls  Description: INTERVENTIONS:  - Educate patient/family on patient safety including physical limitations  - Instruct patient to call for assistance with activity   - Consult OT/PT to assist with strengthening/mobility   - Keep Call bell within reach  - Keep bed low and locked with side rails adjusted as appropriate  - Keep care items and personal belongings within reach  - Initiate and maintain comfort rounds  - Make Fall Risk Sign visible to staff  - Offer Toileting every    Hours,  in advance of need  - Initiate/Maintain   alarm  - Obtain necessary fall risk management equipment:     - Apply yellow socks and bracelet for high fall risk patients  - Consider moving patient to room near nurses station  Outcome: Progressing  Goal: Maintain or return to baseline ADL function  Description: INTERVENTIONS:  -  Assess patient's ability to carry out ADLs; assess patient's baseline for ADL function and identify physical deficits which impact ability to perform ADLs (bathing, care of mouth/teeth, toileting, grooming, dressing, etc.)  - Assess/evaluate cause of self-care deficits   - Assess range of motion  - Assess patient's mobility; develop plan if impaired  - Assess patient's need for assistive devices and provide as appropriate  - Encourage maximum independence but intervene and supervise when necessary  - Involve family in performance of ADLs  - Assess for home care needs following discharge   - Consider OT consult to assist with ADL evaluation and planning for discharge  - Provide patient education as appropriate  Outcome: Progressing  Goal: Maintains/Returns to pre admission functional level  Description: INTERVENTIONS:  - Perform AM-PAC 6 Click Basic Mobility/ Daily Activity assessment daily.  - Set and communicate daily mobility goal to care team and patient/family/caregiver.   - Collaborate with rehabilitation services on mobility goals if consulted  - Perform Range of Motion    times a day.  - Reposition patient every    hours.  - Dangle patient    times a day  - Stand patient    times a day  - Ambulate patient    times a day  - Out of bed to chair    times a day   - Out of bed for meals        times a day  - Out of bed for toileting  - Record patient progress and toleration of activity level   Outcome: Progressing     Problem: DISCHARGE PLANNING  Goal: Discharge to home or other facility with appropriate resources  Description: INTERVENTIONS:  - Identify barriers to discharge w/patient and  caregiver  - Arrange for needed discharge resources and transportation as appropriate  - Identify discharge learning needs (meds, wound care, etc.)  - Arrange for interpretive services to assist at discharge as needed  - Refer to Case Management Department for coordinating discharge planning if the patient needs post-hospital services based on physician/advanced practitioner order or complex needs related to functional status, cognitive ability, or social support system  Outcome: Progressing     Problem: Knowledge Deficit  Goal: Patient/family/caregiver demonstrates understanding of disease process, treatment plan, medications, and discharge instructions  Description: Complete learning assessment and assess knowledge base.  Interventions:  - Provide teaching at level of understanding  - Provide teaching via preferred learning methods  Outcome: Progressing

## 2024-06-23 NOTE — PLAN OF CARE
Problem: PAIN - ADULT  Goal: Verbalizes/displays adequate comfort level or baseline comfort level  Description: Interventions:  - Encourage patient to monitor pain and request assistance  - Assess pain using appropriate pain scale  - Administer analgesics based on type and severity of pain and evaluate response  - Implement non-pharmacological measures as appropriate and evaluate response  - Consider cultural and social influences on pain and pain management  - Notify physician/advanced practitioner if interventions unsuccessful or patient reports new pain  6/23/2024 1516 by Leena Ureña RN  Outcome: Progressing  6/23/2024 1500 by Leena Ureña RN  Outcome: Progressing     Problem: INFECTION - ADULT  Goal: Absence or prevention of progression during hospitalization  Description: INTERVENTIONS:  - Assess and monitor for signs and symptoms of infection  - Monitor lab/diagnostic results  - Monitor all insertion sites, i.e. indwelling lines, tubes, and drains  - Monitor endotracheal if appropriate and nasal secretions for changes in amount and color  - [place] appropriate cooling/warming therapies per order  - Administer medications as ordered  - Instruct and encourage patient and family to use good hand hygiene technique  - Identify and instruct in appropriate isolation precautions for identified infection/condition  6/23/2024 1516 by Leena Ureña RN  Outcome: Progressing  6/23/2024 1500 by Leena Ureña RN  Outcome: Progressing  Goal: Absence of fever/infection during neutropenic period  Description: INTERVENTIONS:  - Monitor WBC    6/23/2024 1516 by Leena rUeña RN  Outcome: Progressing  6/23/2024 1500 by Leena Ureña RN  Outcome: Progressing     Problem: SAFETY ADULT  Goal: Patient will remain free of falls  Description: INTERVENTIONS:  - Educate patient/family on patient safety including physical limitations  - Instruct patient to call for assistance with activity   - Consult OT/PT  to assist with strengthening/mobility   - Keep Call bell within reach  - Keep bed low and locked with side rails adjusted as appropriate  - Keep care items and personal belongings within reach  - Initiate and maintain comfort rounds  - Make Fall Risk Sign visible to staff  - Offer Toileting every \ Hours, in advance of need  - Initiate/Maintain   alarm  - Obtain necessary fall risk management equipment:     - Apply yellow socks and bracelet for high fall risk patients  - Consider moving patient to room near nurses station  6/23/2024 1516 by Leena Ureña RN  Outcome: Progressing  6/23/2024 1500 by Leena Ureña RN  Outcome: Progressing  Goal: Maintain or return to baseline ADL function  Description: INTERVENTIONS:  -  Assess patient's ability to carry out ADLs; assess patient's baseline for ADL function and identify physical deficits which impact ability to perform ADLs (bathing, care of mouth/teeth, toileting, grooming, dressing, etc.)  - Assess/evaluate cause of self-care deficits   - Assess range of motion  - Assess patient's mobility; develop plan if impaired  - Assess patient's need for assistive devices and provide as appropriate  - Encourage maximum independence but intervene and supervise when necessary  - Involve family in performance of ADLs  - Assess for home care needs following discharge   - Consider OT consult to assist with ADL evaluation and planning for discharge  - Provide patient education as appropriate  6/23/2024 1516 by Leena Ureña RN  Outcome: Progressing  6/23/2024 1500 by Leena Ureña RN  Outcome: Progressing  Goal: Maintains/Returns to pre admission functional level  Description: INTERVENTIONS:  - Perform AM-PAC 6 Click Basic Mobility/ Daily Activity assessment daily.  - Set and communicate daily mobility goal to care team and patient/family/caregiver.   - Collaborate with rehabilitation services on mobility goals if consulted  - Perform Range of Motion    times a day.  -  Reposition patient every    hours.  - Dangle patient    times a day  - Stand patient    times a day  - Ambulate patient    times a day  - Out of bed to chair    times a day   - Out of bed for meals          times a day  - Out of bed for toileting  - Record patient progress and toleration of activity level   6/23/2024 1516 by Leena Ureña RN  Outcome: Progressing  6/23/2024 1500 by Leena Ureña RN  Outcome: Progressing     Problem: DISCHARGE PLANNING  Goal: Discharge to home or other facility with appropriate resources  Description: INTERVENTIONS:  - Identify barriers to discharge w/patient and caregiver  - Arrange for needed discharge resources and transportation as appropriate  - Identify discharge learning needs (meds, wound care, etc.)  - Arrange for interpretive services to assist at discharge as needed  - Refer to Case Management Department for coordinating discharge planning if the patient needs post-hospital services based on physician/advanced practitioner order or complex needs related to functional status, cognitive ability, or social support system  6/23/2024 1516 by Leena Ureña RN  Outcome: Progressing  6/23/2024 1500 by Leena Ureña RN  Outcome: Progressing     Problem: Knowledge Deficit  Goal: Patient/family/caregiver demonstrates understanding of disease process, treatment plan, medications, and discharge instructions  Description: Complete learning assessment and assess knowledge base.  Interventions:  - Provide teaching at level of understanding  - Provide teaching via preferred learning methods  6/23/2024 1516 by Leena Ureña RN  Outcome: Progressing  6/23/2024 1500 by Leena Ureña RN  Outcome: Progressing

## 2024-06-24 VITALS
RESPIRATION RATE: 16 BRPM | BODY MASS INDEX: 43.94 KG/M2 | WEIGHT: 248 LBS | HEIGHT: 63 IN | SYSTOLIC BLOOD PRESSURE: 111 MMHG | TEMPERATURE: 97.9 F | HEART RATE: 66 BPM | DIASTOLIC BLOOD PRESSURE: 68 MMHG | OXYGEN SATURATION: 97 %

## 2024-06-24 LAB
ANION GAP SERPL CALCULATED.3IONS-SCNC: 6 MMOL/L (ref 4–13)
BASOPHILS # BLD AUTO: 0.06 THOUSANDS/ÂΜL (ref 0–0.1)
BASOPHILS NFR BLD AUTO: 1 % (ref 0–1)
BUN SERPL-MCNC: 7 MG/DL (ref 5–25)
CALCIUM SERPL-MCNC: 8.7 MG/DL (ref 8.4–10.2)
CHLORIDE SERPL-SCNC: 104 MMOL/L (ref 96–108)
CO2 SERPL-SCNC: 30 MMOL/L (ref 21–32)
CREAT SERPL-MCNC: 0.95 MG/DL (ref 0.6–1.3)
EOSINOPHIL # BLD AUTO: 0.33 THOUSAND/ÂΜL (ref 0–0.61)
EOSINOPHIL NFR BLD AUTO: 4 % (ref 0–6)
ERYTHROCYTE [DISTWIDTH] IN BLOOD BY AUTOMATED COUNT: 12.8 % (ref 11.6–15.1)
GFR SERPL CREATININE-BSD FRML MDRD: 93 ML/MIN/1.73SQ M
GLUCOSE SERPL-MCNC: 98 MG/DL (ref 65–140)
HCT VFR BLD AUTO: 43.6 % (ref 36.5–49.3)
HGB BLD-MCNC: 14.3 G/DL (ref 12–17)
IMM GRANULOCYTES # BLD AUTO: 0.01 THOUSAND/UL (ref 0–0.2)
IMM GRANULOCYTES NFR BLD AUTO: 0 % (ref 0–2)
LYMPHOCYTES # BLD AUTO: 2.42 THOUSANDS/ÂΜL (ref 0.6–4.47)
LYMPHOCYTES NFR BLD AUTO: 28 % (ref 14–44)
MAGNESIUM SERPL-MCNC: 2.1 MG/DL (ref 1.9–2.7)
MCH RBC QN AUTO: 29.2 PG (ref 26.8–34.3)
MCHC RBC AUTO-ENTMCNC: 32.8 G/DL (ref 31.4–37.4)
MCV RBC AUTO: 89 FL (ref 82–98)
MONOCYTES # BLD AUTO: 0.89 THOUSAND/ÂΜL (ref 0.17–1.22)
MONOCYTES NFR BLD AUTO: 10 % (ref 4–12)
NEUTROPHILS # BLD AUTO: 4.95 THOUSANDS/ÂΜL (ref 1.85–7.62)
NEUTS SEG NFR BLD AUTO: 57 % (ref 43–75)
NRBC BLD AUTO-RTO: 0 /100 WBCS
PLATELET # BLD AUTO: 284 THOUSANDS/UL (ref 149–390)
PMV BLD AUTO: 9.8 FL (ref 8.9–12.7)
POTASSIUM SERPL-SCNC: 3.6 MMOL/L (ref 3.5–5.3)
RBC # BLD AUTO: 4.9 MILLION/UL (ref 3.88–5.62)
SODIUM SERPL-SCNC: 140 MMOL/L (ref 135–147)
WBC # BLD AUTO: 8.66 THOUSAND/UL (ref 4.31–10.16)

## 2024-06-24 PROCEDURE — 83735 ASSAY OF MAGNESIUM: CPT

## 2024-06-24 PROCEDURE — 85025 COMPLETE CBC W/AUTO DIFF WBC: CPT

## 2024-06-24 PROCEDURE — 80048 BASIC METABOLIC PNL TOTAL CA: CPT

## 2024-06-24 RX ORDER — METRONIDAZOLE 500 MG/1
500 TABLET ORAL EVERY 8 HOURS SCHEDULED
Qty: 30 TABLET | Refills: 0 | Status: SHIPPED | OUTPATIENT
Start: 2024-06-24 | End: 2024-07-04

## 2024-06-24 RX ORDER — CEPHALEXIN 500 MG/1
500 CAPSULE ORAL EVERY 8 HOURS SCHEDULED
Qty: 30 CAPSULE | Refills: 0 | Status: SHIPPED | OUTPATIENT
Start: 2024-06-24 | End: 2024-07-04

## 2024-06-24 RX ORDER — METRONIDAZOLE 500 MG/1
500 TABLET ORAL EVERY 8 HOURS SCHEDULED
Status: DISCONTINUED | OUTPATIENT
Start: 2024-06-24 | End: 2024-06-24 | Stop reason: HOSPADM

## 2024-06-24 RX ORDER — CIPROFLOXACIN 500 MG/1
500 TABLET, FILM COATED ORAL EVERY 12 HOURS SCHEDULED
Status: DISCONTINUED | OUTPATIENT
Start: 2024-06-24 | End: 2024-06-24 | Stop reason: HOSPADM

## 2024-06-24 RX ADMIN — METRONIDAZOLE 500 MG: 500 INJECTION, SOLUTION INTRAVENOUS at 13:00

## 2024-06-24 RX ADMIN — ATORVASTATIN CALCIUM 40 MG: 40 TABLET, FILM COATED ORAL at 09:50

## 2024-06-24 RX ADMIN — METRONIDAZOLE 500 MG: 500 INJECTION, SOLUTION INTRAVENOUS at 06:03

## 2024-06-24 RX ADMIN — CIPROFLOXACIN 500 MG: 500 TABLET, FILM COATED ORAL at 18:01

## 2024-06-24 NOTE — PROGRESS NOTES
"Progress Note - General Surgery   Michael Velazquez 49 y.o. male MRN: [id number]  Unit/Bed#: -01 Encounter: [id number]    Assessment: 48yo M with persistent perforated sigmoid diverticulitis with possible early mural abscess    -Patient was admitted here 6/7-6/10 with acute sigmoid diverticulitis with microperforation, treated conservatively.  No history of diverticulitis prior to this.  Readmitted 3 days ago due to persistent pain, difficulty having BMs, mild leukocytosis, and slightly worsening CT findings. Screening colonoscopy last year with no findings per patient.     CTAP 6/21/24: IMP: Worsening inflammatory changes of perforated sigmoid diverticulitis with possible early mural abscess. No drainable fluid collection.     -Having BMs, abdominal pain resolved, tolerating FLD  -Leukocytosis resolved, WBC 8 (7, 11, 12)  -BMP WNL  -VSS and WNL    PMHx: Hyperlipidemia  PSHx: None    Plan:  Continue conservative management, no acute surgical intervention necessary  IV antibiotics, will convert to orals  possible discharge later today  Advance to low residue surgical soft diet  Analgesics/antiemetics as needed    Subjective/Objective   Chief Complaint: None    Subjective: He is feeling well today he had 2 bowel movements this morning.  His abdominal pain is resolved.  He has tolerated full liquid diet with toast and crackers with no increase in abdominal pain.  Denies fever, chills, nausea, urinary symptoms.    Objective:     General: VSS, NAD, alert, pleasant  Head normocephalic, atraumatic  Neck: supple, NT, no masses or JVD   Lungs nl respiratory effort  Heart RRR   Abd soft, no distension, NABSx4, NT, no masses or guarding  Extremities: FAROM with good strength equal bilaterally, no edema  Neuro: A&Ox3, affect appropriate, distal sensation and muscular strength intact      Blood pressure 133/80, pulse 64, temperature 97.9 °F (36.6 °C), resp. rate 18, height 5' 3\" (1.6 m), weight 112 kg (248 lb), SpO2 " "99%.,Body mass index is 43.93 kg/m².      Intake/Output Summary (Last 24 hours) at 6/24/2024 1345  Last data filed at 6/23/2024 1347  Gross per 24 hour   Intake 0 ml   Output --   Net 0 ml       Invasive Devices       Peripheral Intravenous Line  Duration             Peripheral IV 06/21/24 Left;Upper;Ventral (anterior) Arm 2 days                      Lab, Imaging and other studies:I have personally reviewed pertinent lab results.  , CBC:   Lab Results   Component Value Date    WBC 8.66 06/24/2024    HGB 14.3 06/24/2024    HCT 43.6 06/24/2024    MCV 89 06/24/2024     06/24/2024    RBC 4.90 06/24/2024    MCH 29.2 06/24/2024    MCHC 32.8 06/24/2024    RDW 12.8 06/24/2024    MPV 9.8 06/24/2024    NRBC 0 06/24/2024   , CMP:   Lab Results   Component Value Date    SODIUM 140 06/24/2024    K 3.6 06/24/2024     06/24/2024    CO2 30 06/24/2024    BUN 7 06/24/2024    CREATININE 0.95 06/24/2024    CALCIUM 8.7 06/24/2024    EGFR 93 06/24/2024   , Coagulation: No results found for: \"PT\", \"INR\", \"APTT\", Urinalysis: No results found for: \"COLORU\", \"CLARITYU\", \"SPECGRAV\", \"PHUR\", \"LEUKOCYTESUR\", \"NITRITE\", \"PROTEINUA\", \"GLUCOSEU\", \"KETONESU\", \"BILIRUBINUR\", \"BLOODU\", Amylase: No results found for: \"AMYLASE\", Lipase: No results found for: \"LIPASE\"  VTE Pharmacologic Prophylaxis: Heparin  VTE Mechanical Prophylaxis: sequential compression device      "

## 2024-06-24 NOTE — PLAN OF CARE
Problem: PAIN - ADULT  Goal: Verbalizes/displays adequate comfort level or baseline comfort level  Description: Interventions:  - Encourage patient to monitor pain and request assistance  - Assess pain using appropriate pain scale  - Administer analgesics based on type and severity of pain and evaluate response  - Implement non-pharmacological measures as appropriate and evaluate response  - Consider cultural and social influences on pain and pain management  - Notify physician/advanced practitioner if interventions unsuccessful or patient reports new pain  Outcome: Adequate for Discharge     Problem: INFECTION - ADULT  Goal: Absence or prevention of progression during hospitalization  Description: INTERVENTIONS:  - Assess and monitor for signs and symptoms of infection  - Monitor lab/diagnostic results  - Monitor all insertion sites, i.e. indwelling lines, tubes, and drains  - Monitor endotracheal if appropriate and nasal secretions for changes in amount and color  - [place] appropriate cooling/warming therapies per order  - Administer medications as ordered  - Instruct and encourage patient and family to use good hand hygiene technique  - Identify and instruct in appropriate isolation precautions for identified infection/condition  Outcome: Adequate for Discharge  Goal: Absence of fever/infection during neutropenic period  Description: INTERVENTIONS:  - Monitor WBC    Outcome: Adequate for Discharge     Problem: SAFETY ADULT  Goal: Patient will remain free of falls  Description: INTERVENTIONS:  - Educate patient/family on patient safety including physical limitations  - Instruct patient to call for assistance with activity   - Consult OT/PT to assist with strengthening/mobility   - Keep Call bell within reach  - Keep bed low and locked with side rails adjusted as appropriate  - Keep care items and personal belongings within reach  - Initiate and maintain comfort rounds  - Make Fall Risk Sign visible to  staff  - Offer Toileting every  Hours, in advance of need  - Initiate/Maintain alarm  - Obtain necessary fall risk management equipment:   - Apply yellow socks and bracelet for high fall risk patients  - Consider moving patient to room near nurses station  Outcome: Adequate for Discharge  Goal: Maintain or return to baseline ADL function  Description: INTERVENTIONS:  -  Assess patient's ability to carry out ADLs; assess patient's baseline for ADL function and identify physical deficits which impact ability to perform ADLs (bathing, care of mouth/teeth, toileting, grooming, dressing, etc.)  - Assess/evaluate cause of self-care deficits   - Assess range of motion  - Assess patient's mobility; develop plan if impaired  - Assess patient's need for assistive devices and provide as appropriate  - Encourage maximum independence but intervene and supervise when necessary  - Involve family in performance of ADLs  - Assess for home care needs following discharge   - Consider OT consult to assist with ADL evaluation and planning for discharge  - Provide patient education as appropriate  Outcome: Adequate for Discharge  Goal: Maintains/Returns to pre admission functional level  Description: INTERVENTIONS:  - Perform AM-PAC 6 Click Basic Mobility/ Daily Activity assessment daily.  - Set and communicate daily mobility goal to care team and patient/family/caregiver.   - Collaborate with rehabilitation services on mobility goals if consulted  - Perform Range of Motion  times a day.  - Reposition patient every  hours.  - Dangle patient  times a day  - Stand patient  times a day  - Ambulate patient  times a day  - Out of bed to chair  times a day   - Out of bed for meals times a day  - Out of bed for toileting  - Record patient progress and toleration of activity level   Outcome: Adequate for Discharge     Problem: DISCHARGE PLANNING  Goal: Discharge to home or other facility with appropriate resources  Description: INTERVENTIONS:  -  Identify barriers to discharge w/patient and caregiver  - Arrange for needed discharge resources and transportation as appropriate  - Identify discharge learning needs (meds, wound care, etc.)  - Arrange for interpretive services to assist at discharge as needed  - Refer to Case Management Department for coordinating discharge planning if the patient needs post-hospital services based on physician/advanced practitioner order or complex needs related to functional status, cognitive ability, or social support system  Outcome: Adequate for Discharge     Problem: Knowledge Deficit  Goal: Patient/family/caregiver demonstrates understanding of disease process, treatment plan, medications, and discharge instructions  Description: Complete learning assessment and assess knowledge base.  Interventions:  - Provide teaching at level of understanding  - Provide teaching via preferred learning methods  Outcome: Adequate for Discharge

## 2024-06-24 NOTE — CONSULTS
"Consult received for diverticulitis diet ed.     Pt reports variable po intake depending upon abdominal pain. Reports was previously instructed to follow a low fiber diet at home though reported eating asparagus \"I guess that wasn't good for me.\" Enjoys ice cream and hoping to still be able to eat this. Discussed low fiber diet with pt including avoidance of high fiber fruits/vegetables/whole grains, discussed low fiber alternatives. Answered pt's nutrition related questions to his satisifaction, reinforced importance of low fiber diet at home and as cleared by MD as OP to subsequently follow high fiber diet to help prevent diverticulitis. Provided pt with Low Fiber Nutrition Therapy handout, pt appreciative of diet ed.   "

## 2024-06-24 NOTE — UTILIZATION REVIEW
NOTIFICATION OF INPATIENT ADMISSION   AUTHORIZATION REQUEST   SERVICING FACILITY:   [place] [address]  Tax ID: [id number]  NPI: [id number] ATTENDING PROVIDER:  Attending Name and NPI#: Nani Woo Do [[id number]]  Address: [address] [place] [address]  Phone: [phone]   ADMISSION INFORMATION:  Place of Service: Inpatient [facility]  Place of Service Code: 21  Inpatient Admission Date/Time: 6/21/24  5:08 PM  Discharge Date/Time: No discharge date for patient encounter.  Admitting Diagnosis Code/Description:  Diverticulitis [K57.92]  Abdominal pain [R10.9]     UTILIZATION REVIEW CONTACT:  Celia Viera, Utilization   Network Utilization Review Department  Phone: [phone]  Fax [phone]  Email: [email or web address]  Contact for approvals/pending authorizations, clinical reviews, and discharge.     PHYSICIAN ADVISORY SERVICES:  Medical Necessity Denial & Hthy[place] Review  Phone: [phone]  Fax: [phone]  Email: [email or web address]     DISCHARGE SUPPORT TEAM:  For Patients Discharge Needs & Updates  Phone: [phone] opt. 2 Fax: [phone]  Email: [email or web address]

## 2024-06-24 NOTE — PLAN OF CARE
Problem: PAIN - ADULT  Goal: Verbalizes/displays adequate comfort level or baseline comfort level  Description: Interventions:  - Encourage patient to monitor pain and request assistance  - Assess pain using appropriate pain scale  - Administer analgesics based on type and severity of pain and evaluate response  - Implement non-pharmacological measures as appropriate and evaluate response  - Consider cultural and social influences on pain and pain management  - Notify physician/advanced practitioner if interventions unsuccessful or patient reports new pain  Outcome: Progressing     Problem: INFECTION - ADULT  Goal: Absence or prevention of progression during hospitalization  Description: INTERVENTIONS:  - Assess and monitor for signs and symptoms of infection  - Monitor lab/diagnostic results  - Monitor all insertion sites, i.e. indwelling lines, tubes, and drains  - Monitor endotracheal if appropriate and nasal secretions for changes in amount and color  - [place] appropriate cooling/warming therapies per order  - Administer medications as ordered  - Instruct and encourage patient and family to use good hand hygiene technique  - Identify and instruct in appropriate isolation precautions for identified infection/condition  Outcome: Progressing  Goal: Absence of fever/infection during neutropenic period  Description: INTERVENTIONS:  - Monitor WBC    Outcome: Progressing     Problem: SAFETY ADULT  Goal: Patient will remain free of falls  Description: INTERVENTIONS:  - Educate patient/family on patient safety including physical limitations  - Instruct patient to call for assistance with activity   - Consult OT/PT to assist with strengthening/mobility   - Keep Call bell within reach  - Keep bed low and locked with side rails adjusted as appropriate  - Keep care items and personal belongings within reach  - Initiate and maintain comfort rounds  - Make Fall Risk Sign visible to staff  - Apply yellow socks and bracelet  for high fall risk patients  - Consider moving patient to room near nurses station  Outcome: Progressing  Goal: Maintain or return to baseline ADL function  Description: INTERVENTIONS:  -  Assess patient's ability to carry out ADLs; assess patient's baseline for ADL function and identify physical deficits which impact ability to perform ADLs (bathing, care of mouth/teeth, toileting, grooming, dressing, etc.)  - Assess/evaluate cause of self-care deficits   - Assess range of motion  - Assess patient's mobility; develop plan if impaired  - Assess patient's need for assistive devices and provide as appropriate  - Encourage maximum independence but intervene and supervise when necessary  - Involve family in performance of ADLs  - Assess for home care needs following discharge   - Consider OT consult to assist with ADL evaluation and planning for discharge  - Provide patient education as appropriate  Outcome: Progressing  Goal: Maintains/Returns to pre admission functional level  Description: INTERVENTIONS:  - Perform AM-PAC 6 Click Basic Mobility/ Daily Activity assessment daily.  - Set and communicate daily mobility goal to care team and patient/family/caregiver.   - Collaborate with rehabilitation services on mobility goals if consulted  - Perform Range of Motion 3 times a day.  - Reposition patient every 2 hours.  - Dangle patient 3 times a day  - Stand patient 3 times a day  - Ambulate patient 3 times a day  - Out of bed to chair 3 times a day   - Out of bed for meals 3 times a day  - Out of bed for toileting  - Record patient progress and toleration of activity level   Outcome: Progressing     Problem: DISCHARGE PLANNING  Goal: Discharge to home or other facility with appropriate resources  Description: INTERVENTIONS:  - Identify barriers to discharge w/patient and caregiver  - Arrange for needed discharge resources and transportation as appropriate  - Identify discharge learning needs (meds, wound care, etc.)  -  Arrange for interpretive services to assist at discharge as needed  - Refer to Case Management Department for coordinating discharge planning if the patient needs post-hospital services based on physician/advanced practitioner order or complex needs related to functional status, cognitive ability, or social support system  Outcome: Progressing     Problem: Knowledge Deficit  Goal: Patient/family/caregiver demonstrates understanding of disease process, treatment plan, medications, and discharge instructions  Description: Complete learning assessment and assess knowledge base.  Interventions:  - Provide teaching at level of understanding  - Provide teaching via preferred learning methods  Outcome: Progressing

## 2024-06-25 ENCOUNTER — TRANSITIONAL CARE MANAGEMENT (OUTPATIENT)
Dept: FAMILY MEDICINE CLINIC | Facility: CLINIC | Age: 49
End: 2024-06-25

## 2024-06-25 NOTE — UTILIZATION REVIEW
NOTIFICATION OF ADMISSION DISCHARGE   This is a Notification of Discharge from [facility]. Please be advised that this patient has been discharge from our facility. Below you will find the admission and discharge date and time including the patient’s disposition.   UTILIZATION REVIEW CONTACT:  Celia Viera  Utilization   Network Utilization Review Department  Phone: [phone] x carefully listen to the prompts. All voicemails are confidential.  Email: [email or web address]     ADMISSION INFORMATION  PRESENTATION DATE: 6/21/2024  1:33 PM  OBERVATION ADMISSION DATE:   INPATIENT ADMISSION DATE: 6/21/24  5:08 PM   DISCHARGE DATE: 6/24/2024  6:14 PM   DISPOSITION:Home/Self Care    Network Utilization Review Department  ATTENTION: Please call with any questions or concerns to [phone] and carefully listen to the prompts so that you are directed to the right person. All voicemails are confidential.   For Discharge needs, contact Care Management DC Support Team at [phone] opt. 2  Send all requests for admission clinical reviews, approved or denied determinations and any other requests to dedicated fax number below belonging to the campus where the patient is receiving treatment. List of dedicated fax numbers for the Facilities:  FACILITY NAME UR FAX NUMBER   ADMISSION DENIALS (Administrative/Medical Necessity) [phone]   DISCHARGE SUPPORT TEAM ([facility]) [phone]   PARENT CHILD HEALTH (Maternity/NICU/Pediatrics) [phone]   [facility] [phone]   [facility] [phone]   [facility] [phone]   [facility] [phone]   [facility] [phone]   [facility] [phone]   [facility] [phone]   [facility]  [phone]   [facility] [phone]   [facility] [phone]   [facility] [phone]   [facility] [phone]

## 2024-06-26 LAB
BACTERIA BLD CULT: NORMAL
BACTERIA BLD CULT: NORMAL

## 2024-06-26 NOTE — DISCHARGE SUMMARY
Discharge Summary - Michael Velazquez 49 y.o. male MRN: [id number]    Unit/Bed#: -01 Encounter: [id number]    Admission Date: 6/21/2024   Discharge Date: 06/26/24    Admitting Diagnosis:   Diverticulitis [K57.92]  Abdominal pain [R10.9]    Discharge Diagnoses: Principal Problem:    Diverticulitis  Active Problems:    Morbid obesity (HCC)      Consultations: nutrition    Imaging: CTAP 6/21/24: IMP: Worsening inflammatory changes of perforated sigmoid diverticulitis with possible early mural abscess. No drainable fluid collection.        Procedures Performed: none    HPI: (obtained from admission H&P completed  on 6/21/24 )   Michael Velazquez is a 49 y.o. male who admitted to [facility] from 6/7/2024 to 6/10/2024 with newly diagnosed acute sigmoid diverticulitis with microperforation, treated conservatively.  He was discharged home on Cipro and Flagyl.  He continued to have persistent mild lower abdominal pain and tenderness which prompted him to return to the ED on 6/13/2024 at which time CT showed improvement in the amount of inflammation and air present.  He presents today due to a 1 week history of worsening intermittent episodes of feeling that he has to have a bowel movement but is unable to.  He states that he is passing small amounts of stool daily, but continues to have the sensation that he needs to go.  His abdominal pain is persistent but no worse.  It is mild, located in the mid lower abdomen and aggravated with palpation.  It is worse with a full bladder.  He denies any fever, chills, hematochezia, melena, dysuria, hematuria, bloating, nausea, vomiting.  His oral Cipro Flagyl is due to finish today.  He has been trying to follow a low fiber diet.  He denies needing any pain medication or laxatives.  While in the ED today, lab studies found a mild leukocytosis of WBC 12.9 but were otherwise normal.  Repeat CTAP found sigmoid diverticulitis with interval worsening of pericolonic fat stranding and adjacent  phlegmon and possible early mural abscess development.  Physical exam shows mild tenderness with palpation in the lower abdomen but no peritoneal signs.     Hospital Course: Michael Velazquez is a 49 y.o. male who presented 6/21/2024 with persistent lower abdominal pain and difficulty with bowel movements due to sigmoid diverticulitis.  He was admitted to the surgical service for conservative treatment including IV antibiotics, IV fluids, analgesics, serial exams and repeat labs.  He was started on a clear liquid diet.  On HD 2 he was advanced to full liquids with crackers.  His abdominal pain showed improvement and he began having better bowel function.  On HD4, the patient was feeling well and his symptoms had completely resolved.  He was tolerating a soft low residue diet.  His leukocytosis had resolved.  His antibiotics were converted to orals.  By the end of the day, he was stable for discharge home.  He was sent with a prescription for a 10-day course of Keflex and Flagyl.  He is scheduled to follow-up in the outpatient surgery office in 2 weeks. On the day of discharge, the patient was voiding spontaneously, ambulating at baseline, and pain was resolved. He understood all instructions for discharge. He was also given the names and numbers of the providers as well as instructions for follow up appointments.     Condition at Discharge: good     Discharge instructions/Information to patient and family:   See after visit summary for information provided to patient and family.      Provisions for Follow-Up Care:  See after-visit summary for information related to follow-up care and any pertinent home health orders.      Disposition: Home    Planned Readmission: No    Discharge Statement   I spent 30 minutes discharging the patient. This time was spent on the day of discharge. I had direct contact with the patient on the day of discharge. Additional documentation is required if more than 30 minutes were spent on  discharge.     Discharge Medications:  See after visit summary for reconciled discharge medications provided to patient and family.

## 2024-10-17 ENCOUNTER — ANESTHESIA EVENT (OUTPATIENT)
Dept: ANESTHESIOLOGY | Facility: HOSPITAL | Age: 49
End: 2024-10-17

## 2024-10-17 ENCOUNTER — ANESTHESIA (OUTPATIENT)
Dept: ANESTHESIOLOGY | Facility: HOSPITAL | Age: 49
End: 2024-10-17

## 2024-10-17 NOTE — ANESTHESIA PREPROCEDURE EVALUATION
Procedure:  PRE-OP ONLY    Relevant Problems   No relevant active problems        Physical Exam    Airway    Mallampati score: II  TM Distance: >3 FB  Neck ROM: full     Dental   No notable dental hx     Cardiovascular  Cardiovascular exam normal    Pulmonary  Pulmonary exam normal     Other Findings    BMI 41        Anesthesia Plan  ASA Score- 3     Anesthesia Type- IV sedation with anesthesia with ASA Monitors.         Additional Monitors:     Airway Plan:            Plan Factors-Exercise tolerance (METS): >4 METS.    Chart reviewed.  Imaging results reviewed. Existing labs reviewed. Patient summary reviewed.    Patient is not a current smoker.      Obstructive sleep apnea risk education given perioperatively.        Induction- intravenous.    Postoperative Plan-     Perioperative Resuscitation Plan - Level 1 - Full Code.       Informed Consent-

## 2024-11-10 ENCOUNTER — ANESTHESIA (OUTPATIENT)
Dept: ANESTHESIOLOGY | Facility: HOSPITAL | Age: 49
End: 2024-11-10

## 2024-11-10 ENCOUNTER — ANESTHESIA EVENT (OUTPATIENT)
Dept: ANESTHESIOLOGY | Facility: HOSPITAL | Age: 49
End: 2024-11-10